# Patient Record
Sex: FEMALE | Race: WHITE | NOT HISPANIC OR LATINO | Employment: FULL TIME | URBAN - METROPOLITAN AREA
[De-identification: names, ages, dates, MRNs, and addresses within clinical notes are randomized per-mention and may not be internally consistent; named-entity substitution may affect disease eponyms.]

---

## 2022-10-20 ENCOUNTER — OFFICE VISIT (OUTPATIENT)
Dept: NEUROSURGERY | Facility: CLINIC | Age: 47
End: 2022-10-20

## 2022-10-20 VITALS
HEIGHT: 64 IN | WEIGHT: 212 LBS | SYSTOLIC BLOOD PRESSURE: 124 MMHG | TEMPERATURE: 97.1 F | BODY MASS INDEX: 36.19 KG/M2 | DIASTOLIC BLOOD PRESSURE: 84 MMHG

## 2022-10-20 DIAGNOSIS — M51.36 LUMBAR DEGENERATIVE DISC DISEASE: Primary | ICD-10-CM

## 2022-10-20 PROCEDURE — 99204 OFFICE O/P NEW MOD 45 MIN: CPT | Performed by: PHYSICIAN ASSISTANT

## 2022-10-20 RX ORDER — EMPAGLIFLOZIN 25 MG/1
TABLET, FILM COATED ORAL
COMMUNITY
Start: 2022-09-03 | End: 2023-03-06 | Stop reason: SDUPTHER

## 2022-10-20 RX ORDER — TIZANIDINE 4 MG/1
TABLET ORAL
COMMUNITY
Start: 2022-08-27

## 2022-10-20 RX ORDER — INSULIN LISPRO 100 [IU]/ML
INJECTION, SOLUTION INTRAVENOUS; SUBCUTANEOUS
COMMUNITY
End: 2023-03-06

## 2022-10-20 RX ORDER — PREGABALIN 150 MG/1
CAPSULE ORAL
COMMUNITY
Start: 2022-09-06

## 2022-10-20 RX ORDER — INSULIN GLARGINE 100 [IU]/ML
INJECTION, SOLUTION SUBCUTANEOUS
COMMUNITY
Start: 2022-07-18 | End: 2022-11-28 | Stop reason: SDUPTHER

## 2022-10-20 RX ORDER — LEVOTHYROXINE SODIUM 112 UG/1
TABLET ORAL
COMMUNITY
Start: 2022-09-15 | End: 2023-03-07 | Stop reason: SDUPTHER

## 2022-10-20 RX ORDER — SITAGLIPTIN AND METFORMIN HYDROCHLORIDE 1000; 50 MG/1; MG/1
TABLET, FILM COATED, EXTENDED RELEASE ORAL
COMMUNITY
End: 2022-11-10

## 2022-10-20 RX ORDER — ATORVASTATIN CALCIUM 20 MG/1
TABLET, FILM COATED ORAL
COMMUNITY
Start: 2022-10-08

## 2022-10-20 RX ORDER — CLOMIPRAMINE HYDROCHLORIDE 50 MG/1
CAPSULE ORAL
COMMUNITY
Start: 2022-07-15 | End: 2022-11-10

## 2022-10-20 RX ORDER — FLUVOXAMINE MALEATE 100 MG
TABLET ORAL
COMMUNITY
End: 2022-11-10

## 2022-10-20 RX ORDER — PROCHLORPERAZINE 25 MG/1
SUPPOSITORY RECTAL
COMMUNITY
Start: 2022-09-23 | End: 2022-12-08 | Stop reason: SDUPTHER

## 2022-10-20 RX ORDER — IBUPROFEN 800 MG/1
TABLET ORAL
COMMUNITY
Start: 2022-08-16

## 2022-10-20 RX ORDER — PROCHLORPERAZINE 25 MG/1
SUPPOSITORY RECTAL
COMMUNITY
Start: 2022-09-20 | End: 2022-11-28 | Stop reason: SDUPTHER

## 2022-10-20 RX ORDER — LISINOPRIL 5 MG/1
TABLET ORAL
COMMUNITY
Start: 2022-08-09 | End: 2022-11-29

## 2022-10-20 RX ORDER — ESZOPICLONE 3 MG/1
TABLET, FILM COATED ORAL
COMMUNITY

## 2022-10-20 NOTE — PROGRESS NOTES
Patient: Sammie Lim  : 1975  Gender: female    Primary Care Provider: Geetha Norton    Requesting Provider:  Geetha Norton  [address] [place] [address]     Chief Complaint: Low back pain    History of Present Illness:  This is a 47-year-old woman with a longstanding history of back pain.  She feels that over the last year her symptoms have progressed.  She has pain in her low back that radiates diffusely into her legs.  The symptoms may be worse on the right side.  Her symptoms are nondermatomal.  She states that her symptoms are constant and nonpositional.  She denies any focal numbness or weakness in her lower extremities.  She denies any bowel or bladder difficulties.  She has been through physical therapy without significant improvement.  She takes ibuprofen and Lyrica for fibromyalgia.  She utilizes marijuana for her pain which is effective.  She is planning to undergo gastric bypass in February in [place].  She presents today with a lumbar MRI.      Past Medical and Surgical History:  No past medical history on file.  No past surgical history on file.    Current Medications:    Current Outpatient Medications:   •  atorvastatin (LIPITOR) 20 MG tablet, , Disp: , Rfl:   •  clomiPRAMINE (ANAFRANIL) 50 MG capsule, , Disp: , Rfl:   •  Continuous Blood Gluc Sensor (Dexcom G6 Sensor), , Disp: , Rfl:   •  Continuous Blood Gluc Transmit (Dexcom G6 Transmitter) misc, , Disp: , Rfl:   •  Diclofenac Sodium (VOLTAREN) 1 % gel gel, diclofenac 1 % topical gel  APPLY 4 GRAM TO THE AFFECTED AREA(S) BY TOPICAL ROUTE 4 TIMES PER DAY, Disp: , Rfl:   •  eszopiclone (LUNESTA) 3 MG tablet, eszopiclone 3 mg tablet, Disp: , Rfl:   •  fluvoxaMINE (LUVOX) 100 MG tablet, fluvoxamine 100 mg tablet, Disp: , Rfl:   •  ibuprofen (ADVIL,MOTRIN) 800 MG tablet, , Disp: , Rfl:   •  Insulin Lispro, 1 Unit Dial, (HUMALOG) 100 UNIT/ML solution pen-injector, Humalog KwikPen (U-100) Insulin 100 unit/mL  subcutaneous  INJECT SUBCUTANEOUSLY PER SLIDING SCALE 5 10 UNITS 3 TIMES DAILY BEFORE MEALS, Disp: , Rfl:   •  Jardiance 25 MG tablet tablet, , Disp: , Rfl:   •  Lantus SoloStar 100 UNIT/ML injection pen, , Disp: , Rfl:   •  levothyroxine (SYNTHROID, LEVOTHROID) 112 MCG tablet, , Disp: , Rfl:   •  lisinopril (PRINIVIL,ZESTRIL) 5 MG tablet, , Disp: , Rfl:   •  pregabalin (LYRICA) 150 MG capsule, , Disp: , Rfl:   •  SITagliptin-metFORMIN HCl ER (Janumet XR)  MG tablet, Janumet XR 50 mg-1,000 mg tablet,extended release, Disp: , Rfl:   •  tiZANidine (ZANAFLEX) 4 MG tablet, , Disp: , Rfl:     Allergies:  Allergies   Allergen Reactions   • Aripiprazole Confusion   • Lithium Confusion         Review of Systems   Constitutional: Positive for fatigue.   HENT: Positive for mouth sores.    Endocrine: Positive for heat intolerance.   Musculoskeletal: Positive for back pain, joint swelling and neck pain.   Psychiatric/Behavioral: Positive for agitation. The patient is nervous/anxious.          Physical Exam  Constitutional:       Appearance: Normal appearance.   HENT:      Head: Normocephalic and atraumatic.   Musculoskeletal:         General: Normal range of motion.      Cervical back: Normal range of motion and neck supple.   Skin:     General: Skin is warm and dry.   Neurological:      Mental Status: She is alert and oriented to person, place, and time.      Sensory: Sensation is intact.      Motor: Motor function is intact.      Coordination: Coordination is intact.      Gait: Gait is intact.      Deep Tendon Reflexes:      Reflex Scores:       Patellar reflexes are 1+ on the right side and 1+ on the left side.       Achilles reflexes are 1+ on the right side and 1+ on the left side.  Psychiatric:         Mood and Affect: Mood normal.         Behavior: Behavior normal.           Vitals:    10/20/22 1426   BP: 124/84   BP Location: Right arm   Patient Position: Sitting   Cuff Size: Adult   Temp: 97.1 °F (36.2 °C)  "  [facility]: Infrared   Weight: 96.2 kg (212 lb)   Height: 162.6 cm (64\")         Independent Review of Diagnostic Imaging:  Lumbar MRI performed 4/29/2022 demonstrates degenerative discs at L4-5 and L5-S1.  At L4-5 there is a more leftward disc protrusion narrowing the foramen.  There is moderate foraminal narrowing at L5-S1.    Assessment:  1.  Low back pain  2.  Lumbar degenerative disc disease  2.  Lumbar stenosis without neurogenic claudication    Plan:  This is a 47-year-old woman who is seen today for evaluation of low back and bilateral leg pain.  Her studies demonstrate degenerative discs at L4-5 and L5-S1.  She has generous canal and more leftward foraminal narrowing at L4-5.  Her symptoms are rather diffuse and nondermatomal.  I would like to refer her for a lumbar epidural injection.  We will see patient in follow-up thereafter.  She will call with worsening symptoms in the interim.        Valarie Kebede PA-C  "

## 2022-11-06 NOTE — PROGRESS NOTES
"Chief Complaint: \"Pain in my lower back and legs.\"        History of Present Illness:   Patient: Ms. Sammie Lim, 47 y.o. female   Referring Physician: Valarie Kebede PA-C   Reason for Referral: Consultation for chronic intractable lower back pain.   Pain History: Patient reports a longstanding history of chronic progressive lower back pain, which began without incident. Pain started around 2007. Pain has progressed in intensity over the past years. Patient complains of lower back pain that radiates diffusely into the posterior aspect of the thighs. MRI of the lumbar spine without contrast on 4/25/2022 revealed normal alignment and vertebral body heights. At L4-L5: Posterior disc bulge, facet hypertrophy, ligamentum flavum hypertrophy contributing to severe canal stenosis with clumping of the nerve roots. Moderate bilateral foraminal stenosis. L5-S1: Mild disc osteophyte bulging disc. Mild canal stenosis. Severe bilateral neuroforaminal stenosis with impingement of the exiting L5 nerve roots bilaterally. Sammie Lim underwent neurosurgical consultation with Valarie Kebede PA-C on 10/20/2022, and was found not to be a surgical candidate. Patient has been referred for consideration for interventional pain management measures. She will follow-up with NSA thereafter. Patient is planning a trip to [place] in February to undergo gastric bypass surgery. Patient has failed to obtain pain relief with conservative measures for more than 3 years including oral analgesics (ibuprofen, Lyrica, etc), marijuana (some relief), topical analgesics, heat, physical therapy (last visit 1-2 years ago, no relief), physical therapist directed home exercise program (ongoing), water therapy in the past (made worse), to name a few  Pain Description: Constant pain with intermittent exacerbation, described as aching, shocking, dull, sharp, and shooting sensation.   Radiation of Pain: The pain radiates into the posterior aspect of " her thighs  Pain intensity today: 3/10  Average pain intensity last week: 7/10  Pain intensity ranges from: 0/10 to 9/10  Aggravating factors: Pain increases with bending, twisting, standing, walking. Patient describes neurogenic claudication.    Alleviating factors: Pain decreases with heat, sitting down, lying down (pain free mostly)  Associated Symptoms:   Patient reports pain, numbness and weakness in the lower extremities.   Patient denies any new bladder or bowel problems.   Patient denies difficulties with her balance or recent falls.   Pain interferes with ADLs, general activities (ability to walk, stand, transition from different positions), and affects patient's quality of life  Pain interferes with sleep causing sleep fragmentation   Muscle spasms  Stiffness    Review of previous therapies and additional medical records:  Sammie Lim has already failed the following measures, including:   Conservative Measures: Oral analgesics (ibuprofen, Lyrica, etc), marijuana (some relief), topical analgesics, heat, physical therapy (last visit 1-2 years ago, no relief), physical therapist directed home exercise program (ongoing), water therapy in the past (made worse)  Interventional Measures: None  Surgical Measures: No history of previous cervical spine, lumbar spine or hip surgery  Sammie Lim underwent neurosurgical consultation with Valarie Kebede PA-C on 10/20/2022, and was found not to be a surgical candidate. Patient has been referred for consideration for interventional pain management measures. She will follow-up with NSA thereafter.   Sammie Lim presents with significant comorbidities including Diabetes, hypothyroidism, depression  In terms of current analgesics, Sammie Lim takes: ibuprofen, Voltaren gel, Lyrica, tizanidine. Patient also takes Pristiq   I have reviewed Samuel Report consistent with medication reconciliation.  SOAPP: Not completed by the patient    PHQ-2  Depression Screening  Little interest or pleasure in doing things? 0-->not at all   Feeling down, depressed, or hopeless? 0-->not at all   PHQ-2 Total Score 0     Patient reports remission of depression with current medications    Global Pain Scale 11-10  2022          Pain 12          Feelings 18          Clinical outcomes 10          Activities 20          GPS Total: 60            The [place] Back Pain Disability Scale  DATE 11-10  2022          Sleep through the night 5          Turn over in bed 0          Get out of bed 3          Make your bed 5          Put on socks (pantyhose) 5          Ride in a car 2          Sit in a chair for several hours 4          Stand up for 20-30 minutes 1          Climb one flight of stairs 1          Walk a few blocks (200-300 yards)  2          Walk several miles 5          Run one block (about 50 yards) 5          Take food out of the refrigerator 2          Reach up to high shelves 5          Move a chair 1          Pull or push heavy doors 1          Bend over to clean the bathtub 5          Throw a ball 5          Carry two bags of groceries 1          Lift and carry a heavy suitcase 5          Total score 63            Review of Diagnostic Studies:  I have reviewed and interpreted the images with the patient and used the images and a tridimensional spine model to explain findings. I have reviewed the report, as well.  MRI of the lumbar spine without contrast on 4/25/2022 revealed normal alignment and vertebral body heights.  The conus is seen at T12-L1 and has unremarkable appearance.  The lower thoracic spine reveals no significant canal foraminal stenosis.  Axial imaging:  L1-L2, L2-L3, L3-L4: No significant canal or foraminal stenosis  L4-L5: Posterior disc bulge, facet hypertrophy, ligamentum flavum hypertrophy contributing to severe canal stenosis with clumping of the nerve roots.  Moderate bilateral foraminal stenosis  L5-S1: Mild disc osteophyte bulging disc. Mild  canal stenosis.  Severe bilateral neuroforaminal stenosis with impingement of the exiting L5 nerve roots bilaterally    Review of Systems   Constitutional: Positive for fatigue.   Endocrine: Positive for heat intolerance.   Musculoskeletal: Positive for arthralgias and back pain.   Neurological: Positive for weakness, numbness and headaches.   Psychiatric/Behavioral: Positive for sleep disturbance. The patient is nervous/anxious.    All other systems reviewed and are negative.        Patient Active Problem List   Diagnosis   • Lumbar stenosis with neurogenic claudication   • Adhesive arachnoiditis   • Ligamentum flavum hypertrophy   • Degeneration of lumbar or lumbosacral intervertebral disc   • Spondylosis of lumbar region without myelopathy or radiculopathy   • Anxiety and depression   • Moderate obesity       Past Medical History:   Diagnosis Date   • Anemia    • Arthritis    • Back problem    • Bladder infection    • Diabetes (HCC)    • Thyroid disease          Past Surgical History:   Procedure Laterality Date   •  SECTION      X2 ,    • CHOLECYSTECTOMY  2019   • HYSTERECTOMY  2019         Family History   Problem Relation Age of Onset   • Cancer Mother    • Alcohol abuse Father    • Kidney disease Father    • Cirrhosis Father    • Diabetes Maternal Aunt          Social History     Socioeconomic History   • Marital status:    Tobacco Use   • Smoking status: Never   Substance and Sexual Activity   • Alcohol use: Never   • Drug use: Yes     Types: Marijuana   • Sexual activity: Defer           Current Outpatient Medications:   •  atorvastatin (LIPITOR) 20 MG tablet, , Disp: , Rfl:   •  Continuous Blood Gluc Sensor (Dexcom G6 Sensor), , Disp: , Rfl:   •  Continuous Blood Gluc Transmit (Dexcom G6 Transmitter) misc, , Disp: , Rfl:   •  desvenlafaxine (PRISTIQ) 100 MG 24 hr tablet, Take 1 tablet by mouth Daily., Disp: , Rfl:   •  eszopiclone (LUNESTA) 3 MG tablet, eszopiclone 3 mg tablet,  "Disp: , Rfl:   •  ibuprofen (ADVIL,MOTRIN) 800 MG tablet, , Disp: , Rfl:   •  Insulin Lispro, 1 Unit Dial, (HUMALOG) 100 UNIT/ML solution pen-injector, Humalog KwikPen (U-100) Insulin 100 unit/mL subcutaneous  INJECT SUBCUTANEOUSLY PER SLIDING SCALE 5 10 UNITS 3 TIMES DAILY BEFORE MEALS, Disp: , Rfl:   •  Jardiance 25 MG tablet tablet, , Disp: , Rfl:   •  Lantus SoloStar 100 UNIT/ML injection pen, , Disp: , Rfl:   •  levothyroxine (SYNTHROID, LEVOTHROID) 112 MCG tablet, , Disp: , Rfl:   •  lisinopril (PRINIVIL,ZESTRIL) 5 MG tablet, , Disp: , Rfl:   •  metFORMIN (GLUCOPHAGE) 1000 MG tablet, , Disp: , Rfl:   •  omeprazole (priLOSEC) 40 MG capsule, Take 1 capsule by mouth Daily., Disp: , Rfl:   •  Otezla 30 MG tablet, , Disp: , Rfl:   •  oxybutynin XL (DITROPAN XL) 15 MG 24 hr tablet, , Disp: , Rfl:   •  pregabalin (LYRICA) 150 MG capsule, , Disp: , Rfl:   •  tiZANidine (ZANAFLEX) 4 MG tablet, , Disp: , Rfl:       Allergies   Allergen Reactions   • Aripiprazole Confusion   • Lithium Confusion         /83   Pulse 86   Temp 95.7 °F (35.4 °C)   Ht 162.6 cm (64\")   Wt 99.5 kg (219 lb 6.4 oz)   SpO2 98%   BMI 37.66 kg/m²       Physical Exam:  Constitutional: Patient appears well-developed, well-nourished, well-hydrated, appears younger than stated age  HEENT: Head: Normocephalic and atraumatic  Eyes: Conjunctivae and lids are normal  Pupils: Equal, round, reactive to light  Peripheral vascular exam: Posterior tibialis: right 2+ and left 2+. Dorsalis pedis: right 2+ and left 2+. No edema.   Musculoskeletal   Gait and station: Gait evaluation demonstrated an antalgic gait. Able to walk on heels, toes, tandem walking   Lumbar spine: Passive and active range of motion are limited secondary to pain. Extension, rotation of the lumbar spine increased and reproduced pain. Lumbar facet joint loading maneuvers are positive.  Jordy test and Gaenslen's test are negative   Piriformis maneuvers are negative   Hip joints: " The range of motion of the hip joints is almost full and without pain   Palpation of the bilateral greater trochanter, unrevealing   Examination of the Iliotibial band: unrevealing   Neurological:   Patient is alert and oriented to person, place, and time.   Speech: Normal.   Cortical function: Normal mental status.   Reflex Scores:  Right patellar: 1+  Left patellar: 1+  Right Achilles: 1+  Left Achilles: 1+  Motor strength: 5/5  Motor Tone: Normal  Involuntary movements: None.   Superficial/Primitive Reflexes: Primitive reflexes were absent.   Right Corey: Absent  Left Corey: Absent  Right ankle clonus: Absent  Left ankle clonus: Absent   Babinsky: Absent  Long tract signs: Negative. Straight leg raising test: Negative. Femoral stretch sign: Negative.   Sensory exam: Intact to light touch, intact pain and temperature sensation, intact vibration sensation and normal proprioception.   Coordination: Normal finger to nose and heel to shin. Normal balance and negative Romberg's sign   Skin and subcutaneous tissue: Skin is warm and intact. No rash noted. No cyanosis.   Psychiatric: Judgment and insight: Normal. Recent and remote memory: Intact. Mood and affect: Normal.     ASSESSMENT:   1. Lumbar stenosis with neurogenic claudication    2. Ligamentum flavum hypertrophy    3. Adhesive arachnoiditis    4. Degeneration of lumbar or lumbosacral intervertebral disc    5. Spondylosis of lumbar region without myelopathy or radiculopathy    6. Moderate obesity    7. Anxiety and depression        PLAN/MEDICAL DECISION MAKING:  Ms. Sammie Lim, 47 y.o. female presents with a longstanding history of chronic progressive lower back pain, which began without incident. Pain started around 2007. Pain has progressed in intensity over the past years. Patient complains of lower back pain that radiates into the posterior aspect of the thighs and it is associated with neurogenic claudication. MRI of the lumbar spine without  contrast on 4/25/2022 revealed at L4-L5: Posterior disc bulge, facet hypertrophy, ligamentum flavum hypertrophy contributing to severe canal stenosis with clumping of the nerve roots. Moderate bilateral foraminal stenosis. L5-S1: Mild disc osteophyte bulging disc. Mild canal stenosis. Severe bilateral neuroforaminal stenosis with impingement of the exiting L5 nerve roots bilaterally. Sammie Lim underwent neurosurgical consultation with Valarie Kebede PA-C on 10/20/2022, and was found not to be a surgical candidate. Patient has been referred for consideration for interventional pain management measures. Patient has failed to obtain pain relief with conservative measures for more than 3 years including oral analgesics (ibuprofen, Lyrica, etc), marijuana (some relief), topical analgesics, heat, physical therapy (last visit 1-2 years ago, no relief), physical therapist directed home exercise program (ongoing), water therapy in the past (made worse), to name a few. A comprehensive evaluation including history and physical exam along with pertinent physiologic and functional assessment was performed. Patient presents with intractable pain due to the diagnoses listed above. Patient has failed to respond to conservative modalities, as referenced under HPI including the impact of patient's moderate-to-severe pain contributing to significant impairment in daily activities, ADLs, and a negative impact on quality of life. Supporting diagnostic studies of patient's chronic pain condition have been reviewed. I have reviewed all available patient's medical records as well as previous therapies as referenced above. I had a lengthy conversation with Ms. Sammie Lim regarding her chronic pain condition and potential therapeutic options including risks, benefits, alternative therapies, to name a few. We have discussed using a stepwise approach starting with the shortest or least intense level of treatment, care, or  service as determined by the extent required to diagnose and or treat patient's condition. The treatments proposed are consistent with the patient's medical condition and are known to be as safe and effective by current guidelines and standard of care. There is no evidence of absolute contraindications for the proposed procedures under the current circumstances. These treatments are not considered experimental or investigational. The duration and frequency proposed are considered appropriate for the service in accordance with accepted standards of medical practice for the diagnosis and or treatment of the patient's condition and or intended to improve the patient's level of function. These services will be furnished in a setting appropriate to the patient's medical needs and condition. Therefore, I have proposed the following plan:  1. Interventional pain management measures: Patient will be scheduled for diagnostic and therapeutic bilateral L4-L5 transforaminal epidural steroid injections using the lowest effective dose of steroids, under C-arm fluoroscopic guidance, with the use of contrast dye (unless contraindicated) to confirm appropriate needle placement and spread of contrast dye. We may repeat therapeutic bilateral L4-L5 transforaminal epidural steroid injections Vs diagnostic and therapeutic bilateral L5-S1 transforaminal epidural steroid injections depending on patient's outcome.  As per current guidelines, epidurals will be limited to a maximum of 4 sessions per spinal region in a rolling twelve (12) month period. Continuation of epidural steroid injections over 12 months would only be considered under the following provisions;  • Patient is a high-risk surgical candidate, or the patient does not desire surgery, or recurrence of pain in the same location relieved with ESIs for at least three months and epidural provides at least 50% sustained improvement of pain and/or 50% objective improvement in  function (using same scale as baseline)  • Pain is severe enough to cause a significant degree of functional disability or vocational disability  • The primary care provider will be notified regarding continuation of procedures and repeat steroid use   Patient will follow-up with NSA thereafter.  Patient appears to be an appropriate candidate for a potential mild procedure as ligamentum flavum hypertrophy is one of the main components of her stenosis.  Unfortunately, this may not be covered under her insurance.  If she is found not to be a surgical candidate, then, she could be potential candidate for a spinal cord stimulator trial   2. Diagnostic studies:  A. Flexion and extension X-rays of the lumbar spine to assess lumbar stability  B. EMG/NCV of the bilateral lower extremities   3. Pharmacological measures: Reviewed and discussed;   A. Patient takes ibuprofen, Voltaren gel, Lyrica, tizanidine. Patient also takes Pristiq   B. Trial with Rheumate one tablet once daily (please provide samples)  C. Start pyridoxine 100 mg one tablet by mouth daily take for 30 days, #30, no refills  D. Start alpha lipoid acid 7690-4246 mg per day divided into 3 doses  E. Alprazolam 0.5 mg take 2 tablets 60 minutes prior to procedure, may repeat 1 tablet 30 minutes prior to procedure, #3, no refills.  Patient cannot drive for 24 hours after taking alprazolam  4. Long-term rehabilitation efforts:  A. The patient does not have a history of falls. I did complete a risk assessment for falls.   B. Patient will start a comprehensive physical therapy program at [facility] for [facility], core strengthening, gait and balance training, neurodynamics, E-STIM, myofascial release, cupping, dry needling, home exercise program  C. Start an exercise program such as water therapy and swimming  D. Contrast therapy: Apply ice-packs for 15-20 minutes, followed by heating pads for 15-20 minutes to affected area   E. [facility]erral [facility]. Patient's Body mass index is 37.66 kg/m². Patient counseled on the importance of weight loss to help with overall health and pain control.   LUI Lim  reports that she has never smoked. She does not have any smokeless tobacco history on file.   5. The patient has been instructed to contact my office with any questions or difficulties. The patient understands the plan and agrees to proceed accordingly.    The patient has a documented plan of care to address chronic pain. Sammie Lim reports a pain score of  3/10.  Given her pain assessment as noted, treatment options were discussed and the following options were decided upon as a follow-up plan to address the patient's pain: continuation of current treatment plan for pain, educational materials on pain management, home exercises and therapy, prescription for non-opiod analgesics, referral to Physical Therapy, referral to specialist for assistance in pain treatment guidance, steroid injections, use of non-medical modalities (ice, heat, stretching and/or behavior modifications) and interventional pain management measures.           Pain Management Panel    There is no flowsheet data to display.        DOMINGO query complete. DOMINGO reviewed by Reese Milton MD.     Pain Medications             desvenlafaxine (PRISTIQ) 100 MG 24 hr tablet Take 1 tablet by mouth Daily.    ibuprofen (ADVIL,MOTRIN) 800 MG tablet     Otezla 30 MG tablet     pregabalin (LYRICA) 150 MG capsule     tiZANidine (ZANAFLEX) 4 MG tablet          Please note that portions of this note were completed with a voice recognition program.     Reese Milton MD    Patient Care Team:  Geetha Norton as PCP - General (Family Medicine)  Reese Milton MD as Consulting Physician (Pain Medicine)     No orders of the defined types were placed in this encounter.        Future Appointments   Date Time Provider Department Center    11/28/2022  8:30 AM Bouchra Chao DO [facility]   12/5/2022  9:00 AM Galo Champagne MD [facility] [id number] [facility]   12/19/2022 10:00 AM Aliyah Bourgeois PA-C [facility]

## 2022-11-10 ENCOUNTER — OFFICE VISIT (OUTPATIENT)
Dept: PAIN MEDICINE | Facility: CLINIC | Age: 47
End: 2022-11-10

## 2022-11-10 VITALS
OXYGEN SATURATION: 98 % | DIASTOLIC BLOOD PRESSURE: 83 MMHG | BODY MASS INDEX: 37.46 KG/M2 | HEART RATE: 86 BPM | HEIGHT: 64 IN | WEIGHT: 219.4 LBS | SYSTOLIC BLOOD PRESSURE: 129 MMHG | TEMPERATURE: 95.7 F

## 2022-11-10 DIAGNOSIS — M51.37 DEGENERATION OF LUMBAR OR LUMBOSACRAL INTERVERTEBRAL DISC: ICD-10-CM

## 2022-11-10 DIAGNOSIS — M47.816 SPONDYLOSIS OF LUMBAR REGION WITHOUT MYELOPATHY OR RADICULOPATHY: ICD-10-CM

## 2022-11-10 DIAGNOSIS — F41.9 ANXIETY AND DEPRESSION: ICD-10-CM

## 2022-11-10 DIAGNOSIS — M48.062 LUMBAR STENOSIS WITH NEUROGENIC CLAUDICATION: ICD-10-CM

## 2022-11-10 DIAGNOSIS — F32.A ANXIETY AND DEPRESSION: ICD-10-CM

## 2022-11-10 DIAGNOSIS — M24.28 LIGAMENTUM FLAVUM HYPERTROPHY: ICD-10-CM

## 2022-11-10 DIAGNOSIS — E66.8 MODERATE OBESITY: ICD-10-CM

## 2022-11-10 DIAGNOSIS — M48.062 LUMBAR STENOSIS WITH NEUROGENIC CLAUDICATION: Primary | ICD-10-CM

## 2022-11-10 DIAGNOSIS — G03.9 ADHESIVE ARACHNOIDITIS: ICD-10-CM

## 2022-11-10 PROBLEM — M51.379 DEGENERATION OF LUMBAR OR LUMBOSACRAL INTERVERTEBRAL DISC: Status: ACTIVE | Noted: 2022-11-10

## 2022-11-10 PROBLEM — E66.9 MODERATE OBESITY: Status: ACTIVE | Noted: 2022-11-10

## 2022-11-10 PROCEDURE — 99204 OFFICE O/P NEW MOD 45 MIN: CPT | Performed by: ANESTHESIOLOGY

## 2022-11-10 RX ORDER — DESVENLAFAXINE 100 MG/1
100 TABLET, EXTENDED RELEASE ORAL DAILY
COMMUNITY

## 2022-11-10 RX ORDER — OMEPRAZOLE 40 MG/1
40 CAPSULE, DELAYED RELEASE ORAL DAILY
COMMUNITY

## 2022-11-10 RX ORDER — APREMILAST 30 MG/1
TABLET, FILM COATED ORAL
COMMUNITY
Start: 2022-10-14

## 2022-11-10 RX ORDER — OXYBUTYNIN CHLORIDE 15 MG/1
TABLET, EXTENDED RELEASE ORAL
COMMUNITY
Start: 2022-08-27

## 2022-11-14 RX ORDER — MULTIVITAMIN WITH IRON
100 TABLET ORAL DAILY
Qty: 30 TABLET | Refills: 0 | Status: SHIPPED | OUTPATIENT
Start: 2022-11-14 | End: 2023-01-09

## 2022-11-14 RX ORDER — ALPRAZOLAM 0.5 MG/1
TABLET ORAL
Qty: 3 TABLET | Refills: 0 | Status: SHIPPED | OUTPATIENT
Start: 2022-11-14 | End: 2023-02-27 | Stop reason: SDUPTHER

## 2022-11-14 RX ORDER — ME-TETRAHYDROFOLATE/B12/HRB236 1-1-500 MG
1 CAPSULE ORAL DAILY
Qty: 90 CAPSULE | Refills: 1 | Status: SHIPPED | OUTPATIENT
Start: 2022-11-14

## 2022-11-14 RX ORDER — ST. JOHN'S WORT 300 MG
400 CAPSULE ORAL 3 TIMES DAILY
Qty: 180 CAPSULE | Refills: 5 | Status: SHIPPED | OUTPATIENT
Start: 2022-11-14 | End: 2023-01-09

## 2022-11-21 ENCOUNTER — HOSPITAL ENCOUNTER (OUTPATIENT)
Dept: GENERAL RADIOLOGY | Facility: HOSPITAL | Age: 47
Discharge: HOME | End: 2022-11-21
Admitting: ANESTHESIOLOGY

## 2022-11-21 DIAGNOSIS — M48.062 LUMBAR STENOSIS WITH NEUROGENIC CLAUDICATION: ICD-10-CM

## 2022-11-21 DIAGNOSIS — M47.816 SPONDYLOSIS OF LUMBAR REGION WITHOUT MYELOPATHY OR RADICULOPATHY: ICD-10-CM

## 2022-11-21 PROCEDURE — 72120 X-RAY BEND ONLY L-S SPINE: CPT

## 2022-11-22 RX ORDER — SODIUM, POTASSIUM,MAG SULFATES 17.5-3.13G
SOLUTION, RECONSTITUTED, ORAL ORAL
Qty: 354 ML | Refills: 0 | Status: SHIPPED | OUTPATIENT
Start: 2022-11-22

## 2022-11-28 ENCOUNTER — OFFICE VISIT (OUTPATIENT)
Dept: ENDOCRINOLOGY | Facility: CLINIC | Age: 47
End: 2022-11-28

## 2022-11-28 ENCOUNTER — LAB (OUTPATIENT)
Dept: LAB | Facility: HOSPITAL | Age: 47
End: 2022-11-28

## 2022-11-28 VITALS
OXYGEN SATURATION: 96 % | HEART RATE: 84 BPM | SYSTOLIC BLOOD PRESSURE: 122 MMHG | HEIGHT: 62 IN | DIASTOLIC BLOOD PRESSURE: 75 MMHG | BODY MASS INDEX: 39.38 KG/M2 | WEIGHT: 214 LBS

## 2022-11-28 DIAGNOSIS — E78.2 MIXED HYPERLIPIDEMIA: ICD-10-CM

## 2022-11-28 DIAGNOSIS — Z79.4 TYPE 2 DIABETES MELLITUS WITH HYPERGLYCEMIA, WITH LONG-TERM CURRENT USE OF INSULIN: Primary | ICD-10-CM

## 2022-11-28 DIAGNOSIS — E03.9 ACQUIRED HYPOTHYROIDISM: ICD-10-CM

## 2022-11-28 DIAGNOSIS — E11.65 TYPE 2 DIABETES MELLITUS WITH HYPERGLYCEMIA, WITH LONG-TERM CURRENT USE OF INSULIN: Primary | ICD-10-CM

## 2022-11-28 LAB
ALBUMIN SERPL-MCNC: 4.1 G/DL (ref 3.5–5.2)
ALBUMIN UR-MCNC: <1.2 MG/DL
ALBUMIN/GLOB SERPL: 1.4 G/DL
ALP SERPL-CCNC: 74 U/L (ref 39–117)
ALT SERPL W P-5'-P-CCNC: 17 U/L (ref 1–33)
ANION GAP SERPL CALCULATED.3IONS-SCNC: 9.2 MMOL/L (ref 5–15)
AST SERPL-CCNC: 13 U/L (ref 1–32)
BILIRUB SERPL-MCNC: 0.3 MG/DL (ref 0–1.2)
BUN SERPL-MCNC: 11 MG/DL (ref 6–20)
BUN/CREAT SERPL: 19.6 (ref 7–25)
CALCIUM SPEC-SCNC: 9.3 MG/DL (ref 8.6–10.5)
CHLORIDE SERPL-SCNC: 102 MMOL/L (ref 98–107)
CHOLEST SERPL-MCNC: 158 MG/DL (ref 0–200)
CO2 SERPL-SCNC: 26.8 MMOL/L (ref 22–29)
CREAT SERPL-MCNC: 0.56 MG/DL (ref 0.57–1)
CREAT UR-MCNC: 46.8 MG/DL
DEPRECATED RDW RBC AUTO: 38.6 FL (ref 37–54)
EGFRCR SERPLBLD CKD-EPI 2021: 114.9 ML/MIN/1.73
ERYTHROCYTE [DISTWIDTH] IN BLOOD BY AUTOMATED COUNT: 12.6 % (ref 12.3–15.4)
EXPIRATION DATE: ABNORMAL
EXPIRATION DATE: NORMAL
GLOBULIN UR ELPH-MCNC: 3 GM/DL
GLUCOSE BLDC GLUCOMTR-MCNC: 221 MG/DL (ref 70–130)
GLUCOSE SERPL-MCNC: 166 MG/DL (ref 65–99)
HBA1C MFR BLD: 7.9 %
HCT VFR BLD AUTO: 40.1 % (ref 34–46.6)
HDLC SERPL-MCNC: 65 MG/DL (ref 40–60)
HGB BLD-MCNC: 12.8 G/DL (ref 12–15.9)
LDLC SERPL CALC-MCNC: 81 MG/DL (ref 0–100)
LDLC/HDLC SERPL: 1.25 {RATIO}
Lab: ABNORMAL
Lab: NORMAL
MCH RBC QN AUTO: 26.9 PG (ref 26.6–33)
MCHC RBC AUTO-ENTMCNC: 31.9 G/DL (ref 31.5–35.7)
MCV RBC AUTO: 84.4 FL (ref 79–97)
MICROALBUMIN/CREAT UR: NORMAL MG/G{CREAT}
PLATELET # BLD AUTO: 398 10*3/MM3 (ref 140–450)
PMV BLD AUTO: 10.8 FL (ref 6–12)
POTASSIUM SERPL-SCNC: 4.1 MMOL/L (ref 3.5–5.2)
PROT SERPL-MCNC: 7.1 G/DL (ref 6–8.5)
RBC # BLD AUTO: 4.75 10*6/MM3 (ref 3.77–5.28)
SODIUM SERPL-SCNC: 138 MMOL/L (ref 136–145)
TRIGL SERPL-MCNC: 60 MG/DL (ref 0–150)
TSH SERPL DL<=0.05 MIU/L-ACNC: 0.3 UIU/ML (ref 0.27–4.2)
VLDLC SERPL-MCNC: 12 MG/DL (ref 5–40)
WBC NRBC COR # BLD: 6.19 10*3/MM3 (ref 3.4–10.8)

## 2022-11-28 PROCEDURE — 80050 GENERAL HEALTH PANEL: CPT | Performed by: INTERNAL MEDICINE

## 2022-11-28 PROCEDURE — 99204 OFFICE O/P NEW MOD 45 MIN: CPT | Performed by: INTERNAL MEDICINE

## 2022-11-28 PROCEDURE — 82570 ASSAY OF URINE CREATININE: CPT | Performed by: INTERNAL MEDICINE

## 2022-11-28 PROCEDURE — 80061 LIPID PANEL: CPT | Performed by: INTERNAL MEDICINE

## 2022-11-28 PROCEDURE — 82043 UR ALBUMIN QUANTITATIVE: CPT | Performed by: INTERNAL MEDICINE

## 2022-11-28 PROCEDURE — 83036 HEMOGLOBIN GLYCOSYLATED A1C: CPT | Performed by: INTERNAL MEDICINE

## 2022-11-28 PROCEDURE — 95251 CONT GLUC MNTR ANALYSIS I&R: CPT | Performed by: INTERNAL MEDICINE

## 2022-11-28 RX ORDER — PROCHLORPERAZINE 25 MG/1
SUPPOSITORY RECTAL
Qty: 9 EACH | Refills: 3 | Status: SHIPPED | OUTPATIENT
Start: 2022-11-28

## 2022-11-28 RX ORDER — INSULIN GLARGINE 100 [IU]/ML
35 INJECTION, SOLUTION SUBCUTANEOUS DAILY
Qty: 45 ML | Refills: 1 | Status: SHIPPED | OUTPATIENT
Start: 2022-11-28 | End: 2023-03-06

## 2022-11-28 NOTE — PROGRESS NOTES
Chief Complaint   Patient presents with   • Diabetes        Referring Provider  Geetha Norton*     HPI   Sammie Lim is a 47 y.o. female had concerns including Diabetes.    Diabetes was diagnosed .  Current medications for diabetes include metformin 1000 mg twice (recently changed from janumet), ozempic 0.25 mg weekly (increasing to 0.5 mg this week), lantus 40 units once daily, jardiance 25 mg daily, humalog 3-7 units as needed for high BGs for high carb meals - takes infrequently  She checks her blood sugar 4+ times per day with dexcom. Data reviewed from 11/15/22-22 with avg , SD 66, 57%. Has high postprandial glucose levels, highest after soda.  In target range 57% of the time, high 29%, very high 14%.  She has a history of DKA a few years ago - was sick with flu. She stopped all of her medication, A1c was up to 13. Was not on jardiance at that time.    Diet:  Breakfast: doesn't, drinks coffee with splenda, half and half  Lunch: joanne robbins unwich, Mexican, chicken sandwich - avoids bread  Dinner: snacks - sometimes a bag of popcorn  Drinks: regular soda once a day    Has been on thyroid medication for a long time. Sleep is disrupted - takes lunesta. Wakes several times at night.   Takes levothyroxine as directed with no missed doses.     She has a spinal procedure a week from now.   Also has colonoscopy soon.     Is on ACEI but no history of HTN and no known history of microalbuminuria.     Past Medical History:   Diagnosis Date   • Anemia    • Arthritis    • Back problem    • Bladder infection    • Diabetes (HCC)    • Gestational diabetes    • Hypothyroidism    • Thyroid disease    • Type 2 diabetes mellitus (HCC)      Past Surgical History:   Procedure Laterality Date   •  SECTION      X2 ,    • CHOLECYSTECTOMY  2019   • HYSTERECTOMY  2019      Family History   Problem Relation Age of Onset   • Cancer Mother    • Alcohol abuse Father    •  Kidney disease Father    • Cirrhosis Father    • Diabetes Maternal Aunt       Social History     Socioeconomic History   • Marital status:    Tobacco Use   • Smoking status: Never   Substance and Sexual Activity   • Alcohol use: Never   • Drug use: Yes     Types: Marijuana   • Sexual activity: Not Currently     Partners: Male     Birth control/protection: Post-menopausal, Hysterectomy      Allergies   Allergen Reactions   • Aripiprazole Confusion   • Lithium Confusion      Current Outpatient Medications on File Prior to Visit   Medication Sig Dispense Refill   • Alpha Lipoic Acid 200 MG capsule Take 400 mg by mouth 3 (Three) Times a Day. 180 capsule 5   • ALPRAZolam (Xanax) 0.5 MG tablet take 2 tablets 60 minutes prior to procedure, may repeat 1 tablet 30 minutes prior to procedure 3 tablet 0   • atorvastatin (LIPITOR) 20 MG tablet      • Continuous Blood Gluc Transmit (Dexcom G6 Transmitter) misc      • desvenlafaxine (PRISTIQ) 100 MG 24 hr tablet Take 1 tablet by mouth Daily.     • Dietary Management Product (Rheumate) capsule Take 1 capsule by mouth Daily. 90 capsule 1   • eszopiclone (LUNESTA) 3 MG tablet eszopiclone 3 mg tablet     • ibuprofen (ADVIL,MOTRIN) 800 MG tablet      • Insulin Lispro, 1 Unit Dial, (HUMALOG) 100 UNIT/ML solution pen-injector Humalog KwikPen (U-100) Insulin 100 unit/mL subcutaneous   INJECT SUBCUTANEOUSLY PER SLIDING SCALE 5 10 UNITS 3 TIMES DAILY BEFORE MEALS     • Jardiance 25 MG tablet tablet      • levothyroxine (SYNTHROID, LEVOTHROID) 112 MCG tablet      • lisinopril (PRINIVIL,ZESTRIL) 5 MG tablet      • omeprazole (priLOSEC) 40 MG capsule Take 1 capsule by mouth Daily.     • Otezla 30 MG tablet      • oxybutynin XL (DITROPAN XL) 15 MG 24 hr tablet      • pregabalin (LYRICA) 150 MG capsule      • Semaglutide,0.25 or 0.5MG/DOS, (OZEMPIC) 2 MG/1.5ML solution pen-injector Inject 0.5 mg under the skin into the appropriate area as directed 1 (One) Time Per Week.     •  "sodium-potassium-magnesium sulfates (Suprep Bowel Prep Kit) 17.5-3.13-1.6 GM/177ML solution oral solution Use as directed by provider for colonoscopy prep 354 mL 0   • tiZANidine (ZANAFLEX) 4 MG tablet      • vitamin B-6 (PYRIDOXINE) 100 MG tablet Take 1 tablet by mouth Daily. 30 tablet 0   • [DISCONTINUED] Continuous Blood Gluc Sensor (Dexcom G6 Sensor)      • [DISCONTINUED] Lantus SoloStar 100 UNIT/ML injection pen      • [DISCONTINUED] metFORMIN (GLUCOPHAGE) 1000 MG tablet        No current facility-administered medications on file prior to visit.        Review of Systems   Constitutional: Positive for appetite change.   HENT: Negative.    Respiratory: Negative.    Cardiovascular: Negative.    Gastrointestinal: Negative.    Endocrine:        See HPI   Genitourinary: Negative.    Musculoskeletal: Positive for arthralgias and back pain.   Skin: Negative.    Hematological: Negative.    Psychiatric/Behavioral: Negative.         /75   Pulse 84   Ht 157.5 cm (62\")   Wt 97.1 kg (214 lb)   SpO2 96%   BMI 39.14 kg/m²      Physical Exam  Cardiovascular:      Pulses:           Dorsalis pedis pulses are 2+ on the right side and 2+ on the left side.   Feet:      Right foot:      Skin integrity: Dry skin present.      Toenail Condition: Right toenails are normal.      Left foot:      Skin integrity: Dry skin present.      Toenail Condition: Left toenails are normal.      Comments: Monofilament 5/5 bilaterally    Diabetic Foot Exam Performed and Monofilament Test Performed            Constitutional:  well developed; well nourished  no acute distress  obese - Body mass index is 39.14 kg/m².   ENT/Thyroid: no thyromegaly  no palpable nodules   Eyes: EOM intact  Conjunctiva: clear   Respiratory:  breathing is unlabored  clear to auscultation bilaterally   Cardiovascular:  regular rate and rhythm, S1, S2 normal, no murmur, click, rub or gallop   Chest:  Not performed.   Abdomen: Not performed.   : Not performed. "   Musculoskeletal: negative findings:  ROM of all joints is normal, no deformities present   Skin: dry and warm   Neuro: normal without focal findings and mental status, speech normal, alert and oriented x3   Psych: oriented to time, place and person, mood and affect are within normal limits     LABS AND IMAGING    HbA1c:  Lab Results   Component Value Date    HGBA1C 7.9 11/28/2022     Glucose:  Lab Results   Component Value Date    POCGLU 221 (A) 11/28/2022       Assessment and Plan    Diagnoses and all orders for this visit:    1. Type 2 diabetes mellitus with hyperglycemia, with long-term current use of insulin (HCC) (Primary)  Uncontrolled with hyperglycemia.  A1c 7.9.  No other complications from diabetes.  Continue metformin 1000 mg twice daily, Jardiance 25 mg daily.  Increase Ozempic to 0.5 mg as scheduled this week, titrate up monthly to max tolerated dose.  Decrease Lantus to 35 units, titrate down on dose by 5 units to avoid BG's less than 100 often.  Continue Humalog 3 to 7 units as needed for high carb meals or hyperglycemia.  Continue CGM.    D/c regular soda.  Monofilament updated today.  Ophtho exam should be updated yearly.  Check labs today.  Can discontinue ACE inhibitor if no microalbuminuria. Is only indicated in secondary prevention of diabetic kidney disease or in the setting of hypertension.  -     POC Glucose, Blood  -     POC Glycosylated Hemoglobin (Hb A1C)  -     Lantus SoloStar 100 UNIT/ML injection pen; Inject 35 Units under the skin into the appropriate area as directed Daily.  Dispense: 45 mL; Refill: 1  -     Continuous Blood Gluc Sensor (Dexcom G6 Sensor); Apply  topically Every 10 (Ten) Days.  Dispense: 9 each; Refill: 3  -     CBC (No Diff)  -     Comprehensive Metabolic Panel  -     Microalbumin / Creatinine Urine Ratio - Urine, Clean Catch  -     metFORMIN (GLUCOPHAGE) 1000 MG tablet; Take 1 tablet by mouth 2 (Two) Times a Day With Meals.  Dispense: 360 tablet; Refill: 1    2.  Acquired hypothyroidism  Clinically euthyroid on levothyroxine 112 mcg daily.  Check TSH today and titrate levothyroxine if needed for TSH in the lower range of normal.  -     TSH    3. Mixed hyperlipidemia  On atorvastatin 20 mg daily.  Check fasting lipids today.  -     Lipid Panel         Return in about 3 months (around 2/28/2023) for next scheduled follow up. The patient was instructed to contact the clinic with any interval questions or concerns.    Bouchra Chao, DO   Endocrinologist    Please note that portions of this note were completed with a voice recognition program.

## 2022-11-28 NOTE — PATIENT INSTRUCTIONS
We can increase the dose of ozempic every month. Please call/message after 4 weeks on 0.5 mg weekly dose for another prescription/dose increase.     Decrease lantus to 35 units. Decrease by 5 units as needed for Bgs often < 100.

## 2022-12-05 ENCOUNTER — OUTSIDE FACILITY SERVICE (OUTPATIENT)
Dept: GASTROENTEROLOGY | Facility: CLINIC | Age: 47
End: 2022-12-05

## 2022-12-05 PROCEDURE — 45378 DIAGNOSTIC COLONOSCOPY: CPT | Performed by: INTERNAL MEDICINE

## 2022-12-07 ENCOUNTER — PATIENT MESSAGE (OUTPATIENT)
Dept: ENDOCRINOLOGY | Facility: CLINIC | Age: 47
End: 2022-12-07

## 2022-12-07 DIAGNOSIS — Z79.4 TYPE 2 DIABETES MELLITUS WITH HYPERGLYCEMIA, WITH LONG-TERM CURRENT USE OF INSULIN: Primary | ICD-10-CM

## 2022-12-07 DIAGNOSIS — E11.65 TYPE 2 DIABETES MELLITUS WITH HYPERGLYCEMIA, WITH LONG-TERM CURRENT USE OF INSULIN: Primary | ICD-10-CM

## 2022-12-08 RX ORDER — PROCHLORPERAZINE 25 MG/1
1 SUPPOSITORY RECTAL
Qty: 1 EACH | Refills: 3 | Status: SHIPPED | OUTPATIENT
Start: 2022-12-08 | End: 2022-12-08

## 2022-12-08 RX ORDER — PROCHLORPERAZINE 25 MG/1
1 SUPPOSITORY RECTAL
Qty: 1 EACH | Refills: 3 | Status: SHIPPED | OUTPATIENT
Start: 2022-12-08

## 2022-12-09 ENCOUNTER — PRIOR AUTHORIZATION (OUTPATIENT)
Dept: ENDOCRINOLOGY | Facility: CLINIC | Age: 47
End: 2022-12-09

## 2022-12-09 NOTE — TELEPHONE ENCOUNTER
Sammie coughlin (Key: [id number])  Rx #: [id number]  Ozempic (0.25 or 0.5 MG/DOSE) 2MG/1.5ML pen-injectors     Form  [employer] Electronic PA Form (2017 [employer])  Created  15 hours ago  Sent to Plan  8 minutes ago  Plan Response  8 minutes ago  Submit Clinical Questions  6 minutes ago  Determination  Favorable  6 minutes ago  Message from Plan  PA Case: [id number], Status: Approved, Coverage Starts on: 12/9/2022 12:00:00 AM, Coverage Ends on: 12/9/2023 12:00:00 AM.

## 2022-12-12 ENCOUNTER — OUTSIDE FACILITY SERVICE (OUTPATIENT)
Dept: PAIN MEDICINE | Facility: CLINIC | Age: 47
End: 2022-12-12

## 2022-12-12 PROCEDURE — 64479 NJX AA&/STRD TFRM EPI C/T 1: CPT | Performed by: ANESTHESIOLOGY

## 2022-12-13 ENCOUNTER — TELEPHONE (OUTPATIENT)
Dept: PAIN MEDICINE | Facility: CLINIC | Age: 47
End: 2022-12-13

## 2022-12-13 NOTE — TELEPHONE ENCOUNTER
FOLLOW-UP CALL AFTER PROCEDURE  Spoke with pt regarding how they are feeling after yesterday's procedure with Dr. Milton. Patient advises that they are doing well.   Pain level before procedure: 9/10   Pain level after procedure: 0/10  Patient reports that the pain relief lasting for hours. (pain level 2/10).   Patient denies side effects or complications  Patient does not have any questions or concerns at this time. I advised pt of their next follow up with TESSY Millan.

## 2023-01-05 DIAGNOSIS — E11.65 TYPE 2 DIABETES MELLITUS WITH HYPERGLYCEMIA, WITH LONG-TERM CURRENT USE OF INSULIN: Primary | ICD-10-CM

## 2023-01-05 DIAGNOSIS — Z79.4 TYPE 2 DIABETES MELLITUS WITH HYPERGLYCEMIA, WITH LONG-TERM CURRENT USE OF INSULIN: Primary | ICD-10-CM

## 2023-01-05 RX ORDER — SEMAGLUTIDE 1.34 MG/ML
1 INJECTION, SOLUTION SUBCUTANEOUS
Qty: 3 ML | Refills: 1 | Status: SHIPPED | OUTPATIENT
Start: 2023-01-05 | End: 2023-02-15 | Stop reason: ALTCHOICE

## 2023-01-09 ENCOUNTER — OFFICE VISIT (OUTPATIENT)
Dept: NEUROSURGERY | Facility: CLINIC | Age: 48
End: 2023-01-09
Payer: COMMERCIAL

## 2023-01-09 VITALS
DIASTOLIC BLOOD PRESSURE: 64 MMHG | BODY MASS INDEX: 37.98 KG/M2 | WEIGHT: 206.4 LBS | SYSTOLIC BLOOD PRESSURE: 112 MMHG | HEIGHT: 62 IN | TEMPERATURE: 96.9 F

## 2023-01-09 DIAGNOSIS — M19.90 ARTHRITIS: Primary | ICD-10-CM

## 2023-01-09 DIAGNOSIS — M47.816 SPONDYLOSIS OF LUMBAR REGION WITHOUT MYELOPATHY OR RADICULOPATHY: ICD-10-CM

## 2023-01-09 DIAGNOSIS — M48.062 LUMBAR STENOSIS WITH NEUROGENIC CLAUDICATION: ICD-10-CM

## 2023-01-09 DIAGNOSIS — M51.37 DEGENERATION OF LUMBAR OR LUMBOSACRAL INTERVERTEBRAL DISC: ICD-10-CM

## 2023-01-09 PROCEDURE — 99213 OFFICE O/P EST LOW 20 MIN: CPT | Performed by: PHYSICIAN ASSISTANT

## 2023-01-09 NOTE — PROGRESS NOTES
Sammie MCKENZIE Raphael   1975   [id number]       2023     Chief Complaint   Patient presents with   • Back Pain        HPI   This 48 yo female has chronic LBP, for the past year she endorses pain into both legs. She has been to PM for injections of bilateral transforaminal narrowing at L4-5.     Chronic Illnesses:  Past Medical History:  No date: Anemia  No date: Arthritis  No date: Back problem  No date: Bladder infection  No date: Diabetes (HCC)  : Gestational diabetes  : Hypothyroidism  No date: Thyroid disease  2000: Type 2 diabetes mellitus (HCC)     Past Surgical History:   Procedure Laterality Date   •  SECTION      X2 ,    • CHOLECYSTECTOMY  2019   • HYSTERECTOMY  2019        Allergies   Allergen Reactions   • Aripiprazole Confusion   • Lithium Confusion          Current Outpatient Medications:   •  ALPRAZolam (Xanax) 0.5 MG tablet, take 2 tablets 60 minutes prior to procedure, may repeat 1 tablet 30 minutes prior to procedure, Disp: 3 tablet, Rfl: 0  •  atorvastatin (LIPITOR) 20 MG tablet, , Disp: , Rfl:   •  Continuous Blood Gluc Sensor (Dexcom G6 Sensor), Apply  topically Every 10 (Ten) Days., Disp: 9 each, Rfl: 3  •  Continuous Blood Gluc Transmit (Dexcom G6 Transmitter) misc, 1 each by Other route Every 3 (Three) Months., Disp: 1 each, Rfl: 3  •  desvenlafaxine (PRISTIQ) 100 MG 24 hr tablet, Take 1 tablet by mouth Daily., Disp: , Rfl:   •  Dietary Management Product (Rheumate) capsule, Take 1 capsule by mouth Daily., Disp: 90 capsule, Rfl: 1  •  eszopiclone (LUNESTA) 3 MG tablet, eszopiclone 3 mg tablet, Disp: , Rfl:   •  ibuprofen (ADVIL,MOTRIN) 800 MG tablet, , Disp: , Rfl:   •  Insulin Lispro, 1 Unit Dial, (HUMALOG) 100 UNIT/ML solution pen-injector, Humalog KwikPen (U-100) Insulin 100 unit/mL subcutaneous  INJECT SUBCUTANEOUSLY PER SLIDING SCALE 5 10 UNITS 3 TIMES DAILY BEFORE MEALS, Disp: , Rfl:   •  Jardiance 25 MG tablet tablet, , Disp: , Rfl:   •  Lantus  SoloStar 100 UNIT/ML injection pen, Inject 35 Units under the skin into the appropriate area as directed Daily., Disp: 45 mL, Rfl: 1  •  levothyroxine (SYNTHROID, LEVOTHROID) 112 MCG tablet, , Disp: , Rfl:   •  metFORMIN (GLUCOPHAGE) 1000 MG tablet, Take 1 tablet by mouth 2 (Two) Times a Day With Meals., Disp: 360 tablet, Rfl: 1  •  omeprazole (priLOSEC) 40 MG capsule, Take 1 capsule by mouth Daily., Disp: , Rfl:   •  Otezla 30 MG tablet, , Disp: , Rfl:   •  oxybutynin XL (DITROPAN XL) 15 MG 24 hr tablet, , Disp: , Rfl:   •  pregabalin (LYRICA) 150 MG capsule, , Disp: , Rfl:   •  Semaglutide, 1 MG/DOSE, (Ozempic, 1 MG/DOSE,) 4 MG/3ML solution pen-injector, Inject 1 mg under the skin into the appropriate area as directed Every 7 (Seven) Days., Disp: 3 mL, Rfl: 1  •  sodium-potassium-magnesium sulfates (Suprep Bowel Prep Kit) 17.5-3.13-1.6 GM/177ML solution oral solution, Use as directed by provider for colonoscopy prep, Disp: 354 mL, Rfl: 0  •  tiZANidine (ZANAFLEX) 4 MG tablet, , Disp: , Rfl:      Social History     Socioeconomic History   • Marital status:    Tobacco Use   • Smoking status: Never   Substance and Sexual Activity   • Alcohol use: Never   • Drug use: Yes     Types: Marijuana   • Sexual activity: Not Currently     Partners: Male     Birth control/protection: Post-menopausal, Hysterectomy        family history includes Alcohol abuse in her father; Cancer in her mother; Cirrhosis in her father; Diabetes in her maternal aunt; Kidney disease in her father.     Social History    Tobacco Use      Smoking status: Never      Smokeless tobacco: Not on file       Body mass index is 37.75 kg/m².   Class 2 Severe Obesity (BMI >=35 and <=39.9). Obesity-related health conditions include the following: osteoarthritis. Obesity is unchanged. BMI is is above average; BMI management plan is completed.     /64 (BP Location: Right arm, Patient Position: Sitting, Cuff Size: Adult)   Temp 96.9 °F (36.1 °C)  "(Infrared)   Ht 157.5 cm (62\")   Wt 93.6 kg (206 lb 6.4 oz)   BMI 37.75 kg/m²    Physical Examination:  HEENT-wnl  Lungs-No wheezing or SOB      Neurologic Exam   Patient is alert and oriented.  Pupils are equal and reactive.  Visual fields are full.  EOMI without nystagmus.    Gait is stable.  Reflexes 1+.  No focal weakness in the lower extremities.    Radiological Data Review:  For my review today, prior MRI and xrays that we have reviewed.    Assessment and Plan:  Diagnoses and all orders for this visit:    1. Arthritis (Primary)    2. Lumbar stenosis with neurogenic claudication    3. Degeneration of lumbar or lumbosacral intervertebral disc    4. Spondylosis of lumbar region without myelopathy or radiculopathy    Patient will f/u with PM to discuss further treatment modalities.    No f/u with neurosurgery required.       Aliyah Bourgeois, JOAN      PCP:  Geetha Norton   "

## 2023-02-15 ENCOUNTER — TELEPHONE (OUTPATIENT)
Dept: ENDOCRINOLOGY | Facility: CLINIC | Age: 48
End: 2023-02-15
Payer: COMMERCIAL

## 2023-02-15 DIAGNOSIS — E11.65 TYPE 2 DIABETES MELLITUS WITH HYPERGLYCEMIA, WITH LONG-TERM CURRENT USE OF INSULIN: ICD-10-CM

## 2023-02-15 DIAGNOSIS — E11.65 TYPE 2 DIABETES MELLITUS WITH HYPERGLYCEMIA, WITH LONG-TERM CURRENT USE OF INSULIN: Primary | ICD-10-CM

## 2023-02-15 DIAGNOSIS — Z79.4 TYPE 2 DIABETES MELLITUS WITH HYPERGLYCEMIA, WITH LONG-TERM CURRENT USE OF INSULIN: Primary | ICD-10-CM

## 2023-02-15 DIAGNOSIS — Z79.4 TYPE 2 DIABETES MELLITUS WITH HYPERGLYCEMIA, WITH LONG-TERM CURRENT USE OF INSULIN: ICD-10-CM

## 2023-02-15 RX ORDER — SEMAGLUTIDE 2.68 MG/ML
2 INJECTION, SOLUTION SUBCUTANEOUS
Qty: 9 ML | Refills: 1 | Status: SHIPPED | OUTPATIENT
Start: 2023-02-15 | End: 2023-02-15 | Stop reason: SDUPTHER

## 2023-02-15 RX ORDER — SEMAGLUTIDE 2.68 MG/ML
2 INJECTION, SOLUTION SUBCUTANEOUS
Qty: 9 ML | Refills: 1 | Status: SHIPPED | OUTPATIENT
Start: 2023-02-15 | End: 2023-04-03 | Stop reason: ALTCHOICE

## 2023-02-15 NOTE — TELEPHONE ENCOUNTER
----- Message from Sammie Lim sent at 2/15/2023 11:58 AM EST -----  Regarding: Ozempic  Contact: [phone]  Also can you please use the [facility] on [address]. Thanks again

## 2023-02-27 ENCOUNTER — OFFICE VISIT (OUTPATIENT)
Dept: PAIN MEDICINE | Facility: CLINIC | Age: 48
End: 2023-02-27
Payer: COMMERCIAL

## 2023-02-27 VITALS — HEIGHT: 64 IN | WEIGHT: 192.8 LBS | BODY MASS INDEX: 32.91 KG/M2

## 2023-02-27 DIAGNOSIS — F41.9 ANXIETY AND DEPRESSION: ICD-10-CM

## 2023-02-27 DIAGNOSIS — G03.9 ADHESIVE ARACHNOIDITIS: ICD-10-CM

## 2023-02-27 DIAGNOSIS — M47.816 SPONDYLOSIS OF LUMBAR REGION WITHOUT MYELOPATHY OR RADICULOPATHY: ICD-10-CM

## 2023-02-27 DIAGNOSIS — M51.37 DEGENERATION OF LUMBAR OR LUMBOSACRAL INTERVERTEBRAL DISC: ICD-10-CM

## 2023-02-27 DIAGNOSIS — F32.A ANXIETY AND DEPRESSION: ICD-10-CM

## 2023-02-27 DIAGNOSIS — M24.28 LIGAMENTUM FLAVUM HYPERTROPHY: ICD-10-CM

## 2023-02-27 DIAGNOSIS — M48.062 LUMBAR STENOSIS WITH NEUROGENIC CLAUDICATION: ICD-10-CM

## 2023-02-27 DIAGNOSIS — E66.8 MODERATE OBESITY: ICD-10-CM

## 2023-02-27 DIAGNOSIS — M48.062 LUMBAR STENOSIS WITH NEUROGENIC CLAUDICATION: Primary | ICD-10-CM

## 2023-02-27 PROCEDURE — 99213 OFFICE O/P EST LOW 20 MIN: CPT | Performed by: NURSE PRACTITIONER

## 2023-02-27 RX ORDER — SECUKINUMAB 150 MG/ML
INJECTION SUBCUTANEOUS
COMMUNITY
Start: 2023-02-06

## 2023-02-27 RX ORDER — ALPRAZOLAM 0.5 MG/1
TABLET ORAL
Qty: 3 TABLET | Refills: 0 | Status: SHIPPED | OUTPATIENT
Start: 2023-02-27

## 2023-02-27 RX ORDER — MELOXICAM 15 MG/1
TABLET ORAL
COMMUNITY
Start: 2023-02-09

## 2023-02-27 NOTE — PROGRESS NOTES
"Chief Complaint: \"Gluteal pain.\"        History of Present Illness:   Patient: Ms. Sammie Lim, 47 y.o. female originally referred by Valarie Kebede PA-C in consultation for chronic intractable lower back pain.  Patient reports a longstanding history of chronic lower back pain, which began without incident.  An MRI of the lumbar spine without contrast on 4/25/2022, revealed a posterior disc bulge with facet hypertrophy and ligamentum flavum thickening which contributes to severe spinal canal with clumping of the nerve roots at L4-L5 and moderate bilateral neuroforaminal stenosis.  L5-S1 there is a disc osteophyte complex with disc bulge creating mild canal stenosis with severe bilateral neuroforaminal stenosis.  She underwent neurosurgical consultation with Valarie Kebede PA-C on 10/20/2022, and most recently follow-up consultation with Kacey Bourgeois PA-C on 1/9/2023, and was found not to be a surgical candidate.  We last saw her on December 12, 2022, when she underwent diagnostic and therapeutic bilateral L4-L5 transforaminal epidural steroid injections, from which she reports experiencing 100% pain relief and functional improvement that is ongoing of the symptoms she felt that radiated into the posterior aspect of her thighs.  She tells me she has been walking about 2 miles per day.  Today she complains of more prominent bilateral gluteal pain, exacerbated by protracted sitting.  She did not begin physical therapy thereafter.  She returns today for postprocedure follow-up and evaluation.  Pain Description: Constant pain with intermittent exacerbation, described as aching, shocking, dull, sharp, and shooting sensation.   Radiation of Pain: The pain radiates into the bilateral gluteal region, she no longer experiences symptoms into her thighs  Pain intensity today: 2/10  Average pain intensity last week: 2/10  Pain intensity ranges from: 2/10 to 7/10  Aggravating factors: Pain increases with protracted " sitting, bending, twisting, standing, walking (2 miles).     Alleviating factors: Pain decreases with heat, lying down (pain free mostly)  Associated Symptoms:   Patient denies pain, numbness and weakness in the lower extremities today.   Patient denies any new bladder or bowel problems.   Patient denies difficulties with her balance or recent falls.   Pain interferes with ADLs, general activities (ability to walk, stand, transition from different positions), and affects patient's quality of life  Pain interferes with sleep causing sleep fragmentation       Review of previous therapies and additional medical records:  Sammie Lim has already failed the following measures, including:   Conservative Measures: Oral analgesics, marijuana, topical analgesics, heat, physical therapy, physical therapist directed home exercise program (ongoing), water therapy in the past  Interventional Measures: 12/12/2022: DxTx bilateral L4-L5 transforaminal epidural steroid injections  Surgical Measures: No history of previous cervical spine, lumbar spine or hip surgery  Sammie Lim underwent neurosurgical consultation with Valarie Kebede PA-C on 10/20/2022, and was found not to be a surgical candidate. Patient has been referred for consideration for interventional pain management measures. She will follow-up with NSA thereafter.   Sammie Lim presents with significant comorbidities including Diabetes, hypothyroidism, depression  In terms of current analgesics, Sammie Lim takes: ibuprofen, Voltaren gel, Lyrica, tizanidine. Patient also takes Pristiq   I have reviewed Samuel Report consistent with medication reconciliation.  SOAPP: Not completed by the patient      Global Pain Scale 11-10  2022 02-27  2023         Pain 12 10         Feelings 18 12         Clinical outcomes 10 12         Activities 20 12         GPS Total: 60 56           The [place] Back Pain Disability Scale  DATE 11-10  2022  02-27 2023         Sleep through the night 5 5         Turn over in bed 0 1         Get out of bed 3 1         Make your bed 5 5         Put on socks (pantyhose) 5 2         Ride in a car 2 1         Sit in a chair for several hours 4 2         Stand up for 20-30 minutes 1 0         Climb one flight of stairs 1 0         Walk a few blocks (200-300 yards)  2 0         Walk several miles 5 1         Run one block (about 50 yards) 5 2         Take food out of the refrigerator 2 1         Reach up to high shelves 5 5         Move a chair 1 4         Pull or push heavy doors 1 2         Bend over to clean the bathtub 5 5         Throw a ball 5 5         Carry two bags of groceries 1 1         Lift and carry a heavy suitcase 5 5         Total score 63 48           Review of New Diagnostic Studies:  Lumbar spine x-rays and flexion-extension views 11/21/2022: No evidence of listhesis or instability.    Review of Diagnostic Studies:    MRI of the lumbar spine without contrast 4/25/2022:   L1-L2, L2-L3, L3-L4: No significant canal or foraminal stenosis  L4-L5: Posterior disc bulge, facet hypertrophy, ligamentum flavum hypertrophy contributing to severe canal stenosis with clumping of the nerve roots.  Moderate bilateral foraminal stenosis  L5-S1: Mild disc osteophyte bulging disc. Mild canal stenosis.  Severe bilateral neuroforaminal stenosis with impingement of the exiting L5 nerve roots bilaterally    Review of Systems   Musculoskeletal: Positive for arthralgias, back pain and joint swelling.   All other systems reviewed and are negative.        Patient Active Problem List   Diagnosis   • Lumbar stenosis with neurogenic claudication   • Adhesive arachnoiditis   • Ligamentum flavum hypertrophy   • Degeneration of lumbar or lumbosacral intervertebral disc   • Spondylosis of lumbar region without myelopathy or radiculopathy   • Anxiety and depression   • Moderate obesity   • Arthritis       Past Medical History:   Diagnosis  Date   • Anemia    • Arthritis    • Back problem    • Bladder infection    • Diabetes (HCC)    • Gestational diabetes    • Hypothyroidism    • Thyroid disease    • Type 2 diabetes mellitus (HCC)          Past Surgical History:   Procedure Laterality Date   •  SECTION      X2 ,    • CHOLECYSTECTOMY  2019   • HYSTERECTOMY  2019         Family History   Problem Relation Age of Onset   • Cancer Mother    • Alcohol abuse Father    • Kidney disease Father    • Cirrhosis Father    • Diabetes Maternal Aunt          Social History     Socioeconomic History   • Marital status:    Tobacco Use   • Smoking status: Never   Substance and Sexual Activity   • Alcohol use: Never   • Drug use: Yes     Types: Marijuana   • Sexual activity: Not Currently     Partners: Male     Birth control/protection: Post-menopausal, Hysterectomy           Current Outpatient Medications:   •  atorvastatin (LIPITOR) 20 MG tablet, , Disp: , Rfl:   •  Continuous Blood Gluc Sensor (Dexcom G6 Sensor), Apply  topically Every 10 (Ten) Days., Disp: 9 each, Rfl: 3  •  Continuous Blood Gluc Transmit (Dexcom G6 Transmitter) misc, 1 each by Other route Every 3 (Three) Months., Disp: 1 each, Rfl: 3  •  Cosentyx Sensoready Pen 150 MG/ML solution auto-injector, , Disp: , Rfl:   •  desvenlafaxine (PRISTIQ) 100 MG 24 hr tablet, Take 1 tablet by mouth Daily., Disp: , Rfl:   •  Dietary Management Product (Rheumate) capsule, Take 1 capsule by mouth Daily., Disp: 90 capsule, Rfl: 1  •  eszopiclone (LUNESTA) 3 MG tablet, eszopiclone 3 mg tablet, Disp: , Rfl:   •  ibuprofen (ADVIL,MOTRIN) 800 MG tablet, , Disp: , Rfl:   •  Insulin Lispro, 1 Unit Dial, (HUMALOG) 100 UNIT/ML solution pen-injector, Humalog KwikPen (U-100) Insulin 100 unit/mL subcutaneous  INJECT SUBCUTANEOUSLY PER SLIDING SCALE 5 10 UNITS 3 TIMES DAILY BEFORE MEALS, Disp: , Rfl:   •  Jardiance 25 MG tablet tablet, , Disp: , Rfl:   •  Lantus SoloStar 100 UNIT/ML  "injection pen, Inject 35 Units under the skin into the appropriate area as directed Daily., Disp: 45 mL, Rfl: 1  •  levothyroxine (SYNTHROID, LEVOTHROID) 112 MCG tablet, , Disp: , Rfl:   •  meloxicam (MOBIC) 15 MG tablet, , Disp: , Rfl:   •  metFORMIN (GLUCOPHAGE) 1000 MG tablet, Take 1 tablet by mouth 2 (Two) Times a Day With Meals., Disp: 360 tablet, Rfl: 1  •  omeprazole (priLOSEC) 40 MG capsule, Take 1 capsule by mouth Daily., Disp: , Rfl:   •  Otezla 30 MG tablet, , Disp: , Rfl:   •  oxybutynin XL (DITROPAN XL) 15 MG 24 hr tablet, , Disp: , Rfl:   •  pregabalin (LYRICA) 150 MG capsule, , Disp: , Rfl:   •  Semaglutide, 2 MG/DOSE, (Ozempic, 2 MG/DOSE,) 8 MG/3ML solution pen-injector, Inject 2 mg under the skin into the appropriate area as directed Every 7 (Seven) Days., Disp: 9 mL, Rfl: 1  •  sodium-potassium-magnesium sulfates (Suprep Bowel Prep Kit) 17.5-3.13-1.6 GM/177ML solution oral solution, Use as directed by provider for colonoscopy prep, Disp: 354 mL, Rfl: 0  •  tiZANidine (ZANAFLEX) 4 MG tablet, , Disp: , Rfl:   •  ALPRAZolam (Xanax) 0.5 MG tablet, take 2 tablets 60 minutes prior to procedure, may repeat 1 tablet 30 minutes prior to procedure, Disp: 3 tablet, Rfl: 0      Allergies   Allergen Reactions   • Aripiprazole Confusion   • Lithium Confusion         Ht 162.6 cm (64\")   Wt 87.5 kg (192 lb 12.8 oz)   BMI 33.09 kg/m²       Physical Exam:  Constitutional: Patient appears well-developed, well-nourished, well-hydrated, appears younger than stated age  HEENT: Head: Normocephalic and atraumatic  Eyes: Conjunctivae and lids are normal  Pupils: Equal, round, reactive to light  Peripheral vascular exam: Posterior tibialis: right 2+ and left 2+. Dorsalis pedis: right 2+ and left 2+. No edema.   Musculoskeletal   Gait and station: Gait evaluation demonstrated a normal gait  Lumbar spine: Passive and active range of motion are improved with minimal pain. Extension of the lumbar spine increased and " reproduced pain. Lumbar facet joint loading maneuvers are positive.  Jordy test, Yeomans, SI compression, and Gaenslen's test are negative   Piriformis maneuvers are negative   Hip joints: The range of motion of the hip joints is almost full and without pain   Palpation of the bilateral greater trochanter, unrevealing   Examination of the Iliotibial band: unrevealing   Neurological:   Patient is alert and oriented to person, place, and time.   Speech: Normal.   Cortical function: Normal mental status.   Reflex Scores:  Right patellar: 1+  Left patellar: 1+  Right Achilles: 1+  Left Achilles: 1+  Motor strength: 5/5  Motor Tone: Normal  Involuntary movements: None.   Superficial/Primitive Reflexes: Primitive reflexes were absent.   Right Corey: Absent  Left Corey: Absent  Right ankle clonus: Absent  Left ankle clonus: Absent   Babinsky: Absent  Long tract signs: Negative. Straight leg raising test: Negative. Femoral stretch sign: Negative.   Sensory exam: Intact to light touch, intact pain and temperature sensation, intact vibration sensation and normal proprioception.   Coordination: Normal finger to nose and heel to shin. Normal balance and negative Romberg's sign   Skin and subcutaneous tissue: Skin is warm and intact. No rash noted. No cyanosis.   Psychiatric: Judgment and insight: Normal. Recent and remote memory: Intact. Mood and affect: Normal.     ASSESSMENT:   1. Lumbar stenosis with neurogenic claudication    2. Adhesive arachnoiditis    3. Spondylosis of lumbar region without myelopathy or radiculopathy    4. Degeneration of lumbar or lumbosacral intervertebral disc    5. Ligamentum flavum hypertrophy    6. Moderate obesity    7. Anxiety and depression        PLAN/MEDICAL DECISION MAKING:  Ms. Sammie Lim, 47 y.o. female  reports a longstanding history of chronic lower back pain, which began without incident.  An MRI of the lumbar spine without contrast on 4/25/2022, revealed a posterior  disc bulge with facet hypertrophy and ligamentum flavum thickening which contributes to severe spinal canal with clumping of the nerve roots at L4-L5 and moderate bilateral neuroforaminal stenosis.  L5-S1 there is a disc osteophyte complex with disc bulge creating mild canal stenosis with severe bilateral neuroforaminal stenosis.  She underwent neurosurgical consultation with Valarie Kebede PA-C on 10/20/2022, and most recently follow-up consultation with Kacey Bourgeois PA-C on 1/9/2023, and was found not to be a surgical candidate.  Patient has failed to obtain pain relief with conservative measures including oral analgesics (ibuprofen, Lyrica, etc), marijuana (some relief), topical analgesics, heat, physical therapy, physical therapist directed home exercise program (ongoing), water therapy in the past, to name a few.  Patient underwent bilateral L4-L5 epidural steroid injections, that has provided her with 100% relief of her symptoms that extend into the posterior thighs.  She is now having more symptoms located to the bilateral gluteal region, consistent with an L5-S1 distribution.  She has agreed to some physical therapy there after injection.  I have reviewed all available patient's medical records as well as previous therapies as referenced above. I had a lengthy conversation with Ms. Sammie Ford Lim regarding her chronic pain condition and potential therapeutic options including risks, benefits, alternative therapies, to name a few. Therefore, I have proposed the following plan:  1. Interventional pain management measures: Patient will be scheduled for diagnostic and therapeutic bilateral L5-S1 transforaminal epidural steroid injections.  We may repeat epidural depending on patient's outcome and distribution of pain.  Patient has recently been found not to be a surgical candidate, therefore she could be a potential candidate for spinal cord similar trial if failure to achieve sustained relief from  minimally invasive procedures.  We have briefly discussed this modality today.  2. Diagnostic studies:  A. We may consider EMG/NCV of the bilateral lower extremities pending on continued response  3. Pharmacological measures: Reviewed and discussed;   A. Patient takes ibuprofen, Voltaren gel, Lyrica, tizanidine. Patient also takes Pristiq   B. Continue Rheumate one tablet once daily   4. Long-term rehabilitation efforts:  A. The patient does not have a history of falls. I did complete a risk assessment for falls.   B. Patient will start a comprehensive physical therapy program for Alter-G, core strengthening, gait and balance training, neurodynamics, E-STIM, myofascial release, cupping, dry needling, home exercise program  C. Start an exercise program such as water therapy and swimming  D. Contrast therapy: Apply ice-packs for 15-20 minutes, followed by heating pads for 15-20 minutes to affected area   E. Patient's Body mass index is 33.1 kg/m². Patient counseled on the importance of weight loss to help with overall health and pain control.  She is actively engaging in weight loss.  5. The patient has been instructed to contact my office with any questions or difficulties. The patient understands the plan and agrees to proceed accordingly.        Pain Medications             desvenlafaxine (PRISTIQ) 100 MG 24 hr tablet Take 1 tablet by mouth Daily.    ibuprofen (ADVIL,MOTRIN) 800 MG tablet     meloxicam (MOBIC) 15 MG tablet     Otezla 30 MG tablet     pregabalin (LYRICA) 150 MG capsule     tiZANidine (ZANAFLEX) 4 MG tablet          Please note that portions of this note were completed with a voice recognition program.     TESSY Gatica    Patient Care Team:  Geetha Norton as PCP - General (Family Medicine)  Reese Milton MD as Consulting Physician (Pain Medicine)  Bouchra Chao DO as Consulting Physician (Endocrinology)  Alise Lynn MD as Consulting Physician  (Rheumatology)  Anabel Avina, TESSY as Nurse Practitioner (Pain Medicine)     No orders of the defined types were placed in this encounter.        Future Appointments   Date Time Provider Department Center   3/6/2023 10:45 AM Bouchra Chao DO [facility]   3/20/2023  8:00 AM Galo Champagne MD [id number] [facility]

## 2023-03-06 ENCOUNTER — OFFICE VISIT (OUTPATIENT)
Dept: ENDOCRINOLOGY | Facility: CLINIC | Age: 48
End: 2023-03-06
Payer: COMMERCIAL

## 2023-03-06 VITALS
BODY MASS INDEX: 31.92 KG/M2 | DIASTOLIC BLOOD PRESSURE: 69 MMHG | HEIGHT: 64 IN | HEART RATE: 100 BPM | WEIGHT: 187 LBS | SYSTOLIC BLOOD PRESSURE: 110 MMHG | OXYGEN SATURATION: 97 %

## 2023-03-06 DIAGNOSIS — Z79.4 TYPE 2 DIABETES MELLITUS WITHOUT COMPLICATION, WITH LONG-TERM CURRENT USE OF INSULIN: Primary | ICD-10-CM

## 2023-03-06 DIAGNOSIS — E03.9 ACQUIRED HYPOTHYROIDISM: ICD-10-CM

## 2023-03-06 DIAGNOSIS — E66.9 CLASS 1 OBESITY WITH SERIOUS COMORBIDITY AND BODY MASS INDEX (BMI) OF 32.0 TO 32.9 IN ADULT, UNSPECIFIED OBESITY TYPE: ICD-10-CM

## 2023-03-06 DIAGNOSIS — E11.9 TYPE 2 DIABETES MELLITUS WITHOUT COMPLICATION, WITH LONG-TERM CURRENT USE OF INSULIN: Primary | ICD-10-CM

## 2023-03-06 LAB
EXPIRATION DATE: NORMAL
EXPIRATION DATE: NORMAL
GLUCOSE BLDC GLUCOMTR-MCNC: 129 MG/DL (ref 70–130)
HBA1C MFR BLD: 5.8 %
Lab: NORMAL
Lab: NORMAL
T4 FREE SERPL-MCNC: 2.06 NG/DL (ref 0.93–1.7)
TSH SERPL DL<=0.05 MIU/L-ACNC: 0.03 UIU/ML (ref 0.27–4.2)

## 2023-03-06 PROCEDURE — 84443 ASSAY THYROID STIM HORMONE: CPT | Performed by: INTERNAL MEDICINE

## 2023-03-06 PROCEDURE — 95251 CONT GLUC MNTR ANALYSIS I&R: CPT | Performed by: INTERNAL MEDICINE

## 2023-03-06 PROCEDURE — 83036 HEMOGLOBIN GLYCOSYLATED A1C: CPT | Performed by: INTERNAL MEDICINE

## 2023-03-06 PROCEDURE — 99214 OFFICE O/P EST MOD 30 MIN: CPT | Performed by: INTERNAL MEDICINE

## 2023-03-06 PROCEDURE — 84439 ASSAY OF FREE THYROXINE: CPT | Performed by: INTERNAL MEDICINE

## 2023-03-06 RX ORDER — EMPAGLIFLOZIN 25 MG/1
25 TABLET, FILM COATED ORAL DAILY
Qty: 90 TABLET | Refills: 1 | Status: SHIPPED | OUTPATIENT
Start: 2023-03-06

## 2023-03-06 NOTE — PROGRESS NOTES
"Chief Complaint   Patient presents with   • Diabetes          HPI   Sammie Lim is a 47 y.o. female had concerns including Diabetes.    She is checking blood sugar 4+ times per day with CGM. Data reviewed from the last two weeks shows average glucose 136 with standard deviation of 26. In target range 94%, high 5%, very high less than 1%, low 0%.   Pattern of: Occasional postprandial hyperglycemia    BGs dropped to the 80s at times at work if she is not eating.  Current medications for diabetes include metformin 1000 mg twice daily, Jardiance 25 mg daily, Ozempic 2 mg weekly, Lantus 10 units once daily.    Has infrequent nausea, is tolerated. Has chronic constipation.  No change in these since Ozempic was increased.    Has lost 27 pounds since her last visit here.    The following portions of the patient's history were reviewed and updated as appropriate: allergies, current medications, past family history, past medical history, past social history, past surgical history and problem list.      Review of Systems   Constitutional: Negative.    Gastrointestinal: Positive for constipation and nausea.   Endocrine:        See HPI        Physical Exam  Vitals reviewed.   Constitutional:       Appearance: Normal appearance. She is obese.      Comments: Body mass index is 32.1 kg/m².   Cardiovascular:      Rate and Rhythm: Normal rate.   Pulmonary:      Effort: Pulmonary effort is normal.   Neurological:      General: No focal deficit present.      Mental Status: She is alert. Mental status is at baseline.   Psychiatric:         Mood and Affect: Mood normal.         Behavior: Behavior normal.        /69   Pulse 100   Ht 162.6 cm (64\")   Wt 84.8 kg (187 lb)   SpO2 97%   BMI 32.10 kg/m²      Labs and imaging    CMP:  Lab Results   Component Value Date    BUN 14 03/03/2023    CREATININE 0.49 (L) 03/03/2023    BCR 28.6 (H) 03/03/2023     03/03/2023    K 4.3 03/03/2023    CO2 29.0 03/03/2023    CALCIUM " 9.7 03/03/2023    ALBUMIN 4.5 03/03/2023    BILITOT 0.5 03/03/2023    ALKPHOS 56 03/03/2023    AST 16 03/03/2023    ALT 14 03/03/2023     Lipid Panel:  Lab Results   Component Value Date    CHOL 158 11/28/2022    TRIG 60 11/28/2022    HDL 65 (H) 11/28/2022    VLDL 12 11/28/2022    LDL 81 11/28/2022     HbA1c:  Lab Results   Component Value Date    HGBA1C 5.8 03/06/2023    HGBA1C 7.9 11/28/2022     Glucose:    Lab Results   Component Value Date    POCGLU 129 03/06/2023     Microalbumin:  Lab Results   Component Value Date    MALBCRERATIO  11/28/2022      Comment:      Unable to calculate     TSH:  Lab Results   Component Value Date    TSH 0.296 11/28/2022       Assessment and plan  Diagnoses and all orders for this visit:    1. Type 2 diabetes mellitus without complication, with long-term current use of insulin (HCC) (Primary)  Now controlled with A1c down to 5.8.  No complications from diabetes.  Discontinue Lantus.  Continue metformin 1000 mg twice daily, Jardiance 25 mg daily, Ozempic 2 mg weekly.    Call the office if BG's are persistently greater than 180.  Labs are up-to-date from November.  Ophtho exam should be updated yearly.  Monofilament up-to-date from November.  -     POC Glucose, Blood  -     POC Glycosylated Hemoglobin (Hb A1C)  -     Jardiance 25 MG tablet tablet; Take 1 tablet by mouth Daily.  Dispense: 90 tablet; Refill: 1    2. Acquired hypothyroidism  Clinically euthyroid though complaining of occasional palpitations.  Heart rate 100 today.  Currently on levothyroxine 112 mcg daily and last TSH was in the lowest range of normal.  Recheck TFTs as she may have exogenous hyperthyroidism with a degree of weight loss experienced since her last visit here.  Titrate levothyroxine as needed.  -     T4, Free  -     TSH    3. Class 1 obesity with serious comorbidity and body mass index (BMI) of 32.0 to 32.9 in adult, unspecified obesity type  BMI down to 32.1 with a 27 pound weight loss.  Congratulated on  weight loss and encouraged to continue efforts.  Continue Ozempic as above.       Return in about 4 months (around 7/6/2023). The patient was instructed to contact the clinic with any interval questions or concerns.    Bouchra Chao, DO   Endocrinologist    Please note that portions of this note were completed with a voice recognition program.

## 2023-03-07 DIAGNOSIS — E03.9 ACQUIRED HYPOTHYROIDISM: Primary | ICD-10-CM

## 2023-03-07 RX ORDER — LEVOTHYROXINE SODIUM 88 UG/1
88 TABLET ORAL DAILY
Qty: 30 TABLET | Refills: 5 | Status: SHIPPED | OUTPATIENT
Start: 2023-03-07

## 2023-03-13 ENCOUNTER — TRANSCRIBE ORDERS (OUTPATIENT)
Dept: DIABETES SERVICES | Facility: HOSPITAL | Age: 48
End: 2023-03-13
Payer: COMMERCIAL

## 2023-03-13 DIAGNOSIS — E11.9 DIET-CONTROLLED TYPE 2 DIABETES MELLITUS: Primary | ICD-10-CM

## 2023-03-13 DIAGNOSIS — R53.83 FATIGUE, UNSPECIFIED TYPE: ICD-10-CM

## 2023-03-13 DIAGNOSIS — M48.062 LUMBAR STENOSIS WITH NEUROGENIC CLAUDICATION: Primary | ICD-10-CM

## 2023-03-15 ENCOUNTER — OUTSIDE FACILITY SERVICE (OUTPATIENT)
Dept: PAIN MEDICINE | Facility: CLINIC | Age: 48
End: 2023-03-15
Payer: COMMERCIAL

## 2023-03-15 PROCEDURE — 64483 NJX AA&/STRD TFRM EPI L/S 1: CPT | Performed by: ANESTHESIOLOGY

## 2023-03-15 PROCEDURE — 99152 MOD SED SAME PHYS/QHP 5/>YRS: CPT | Performed by: ANESTHESIOLOGY

## 2023-03-17 ENCOUNTER — TELEPHONE (OUTPATIENT)
Dept: PAIN MEDICINE | Facility: CLINIC | Age: 48
End: 2023-03-17
Payer: COMMERCIAL

## 2023-04-03 DIAGNOSIS — Z79.4 TYPE 2 DIABETES MELLITUS WITHOUT COMPLICATION, WITH LONG-TERM CURRENT USE OF INSULIN: Primary | ICD-10-CM

## 2023-04-03 DIAGNOSIS — E11.9 TYPE 2 DIABETES MELLITUS WITHOUT COMPLICATION, WITH LONG-TERM CURRENT USE OF INSULIN: Primary | ICD-10-CM

## 2023-04-03 RX ORDER — DULAGLUTIDE 4.5 MG/.5ML
4.5 INJECTION, SOLUTION SUBCUTANEOUS
Qty: 2 ML | Refills: 3 | Status: SHIPPED | OUTPATIENT
Start: 2023-04-03

## 2023-04-10 RX ORDER — SODIUM, POTASSIUM,MAG SULFATES 17.5-3.13G
SOLUTION, RECONSTITUTED, ORAL ORAL
Qty: 354 ML | Refills: 0 | Status: SHIPPED | OUTPATIENT
Start: 2023-04-10

## 2023-05-03 DIAGNOSIS — Z79.4 TYPE 2 DIABETES MELLITUS WITHOUT COMPLICATION, WITH LONG-TERM CURRENT USE OF INSULIN: Primary | ICD-10-CM

## 2023-05-03 DIAGNOSIS — E11.9 TYPE 2 DIABETES MELLITUS WITHOUT COMPLICATION, WITH LONG-TERM CURRENT USE OF INSULIN: Primary | ICD-10-CM

## 2023-05-03 RX ORDER — TIRZEPATIDE 2.5 MG/.5ML
2.5 INJECTION, SOLUTION SUBCUTANEOUS
Qty: 2 ML | Refills: 0 | Status: SHIPPED | OUTPATIENT
Start: 2023-05-03

## 2023-05-04 ENCOUNTER — PRIOR AUTHORIZATION (OUTPATIENT)
Dept: ENDOCRINOLOGY | Facility: CLINIC | Age: 48
End: 2023-05-04
Payer: COMMERCIAL

## 2023-05-04 NOTE — TELEPHONE ENCOUNTER
Sammie Lim (Key: [id number])  Rx #: [id number]  Mounjaro 2.5MG/0.5ML pen-injectors     Form  [employer] Electronic PA Form (2017 N[employer])  Created  23 hours ago  Sent to Plan  10 minutes ago  Plan Response  9 minutes ago  Submit Clinical Questions  2 minutes ago  Determination  Favorable  2 minutes ago  Message from Plan  PA Case: [id number], Status: Approved, Coverage Starts on: 5/4/2023 12:00:00 AM, Coverage Ends on: 5/3/2024

## 2023-05-09 ENCOUNTER — TRANSCRIBE ORDERS (OUTPATIENT)
Dept: ADMINISTRATIVE | Facility: HOSPITAL | Age: 48
End: 2023-05-09
Payer: COMMERCIAL

## 2023-05-09 DIAGNOSIS — Z12.31 VISIT FOR SCREENING MAMMOGRAM: Primary | ICD-10-CM

## 2023-05-10 ENCOUNTER — TELEPHONE (OUTPATIENT)
Dept: ENDOCRINOLOGY | Facility: CLINIC | Age: 48
End: 2023-05-10
Payer: COMMERCIAL

## 2023-05-25 DIAGNOSIS — Z79.4 TYPE 2 DIABETES MELLITUS WITHOUT COMPLICATION, WITH LONG-TERM CURRENT USE OF INSULIN: Primary | ICD-10-CM

## 2023-05-25 DIAGNOSIS — E11.9 TYPE 2 DIABETES MELLITUS WITHOUT COMPLICATION, WITH LONG-TERM CURRENT USE OF INSULIN: Primary | ICD-10-CM

## 2023-05-25 RX ORDER — TIRZEPATIDE 5 MG/.5ML
5 INJECTION, SOLUTION SUBCUTANEOUS
Qty: 2 ML | Refills: 1 | Status: SHIPPED | OUTPATIENT
Start: 2023-05-25

## 2023-06-05 DIAGNOSIS — E11.9 TYPE 2 DIABETES MELLITUS WITHOUT COMPLICATION, WITH LONG-TERM CURRENT USE OF INSULIN: ICD-10-CM

## 2023-06-05 DIAGNOSIS — Z79.4 TYPE 2 DIABETES MELLITUS WITHOUT COMPLICATION, WITH LONG-TERM CURRENT USE OF INSULIN: ICD-10-CM

## 2023-06-05 RX ORDER — TIRZEPATIDE 5 MG/.5ML
5 INJECTION, SOLUTION SUBCUTANEOUS
Qty: 2 ML | Refills: 1 | Status: SHIPPED | OUTPATIENT
Start: 2023-06-05

## 2023-06-19 ENCOUNTER — OFFICE VISIT (OUTPATIENT)
Dept: PAIN MEDICINE | Facility: CLINIC | Age: 48
End: 2023-06-19
Payer: COMMERCIAL

## 2023-06-19 VITALS — BODY MASS INDEX: 29.37 KG/M2 | RESPIRATION RATE: 16 BRPM | HEIGHT: 64 IN | WEIGHT: 172 LBS

## 2023-06-19 DIAGNOSIS — G03.9 ADHESIVE ARACHNOIDITIS: ICD-10-CM

## 2023-06-19 DIAGNOSIS — M47.816 SPONDYLOSIS OF LUMBAR REGION WITHOUT MYELOPATHY OR RADICULOPATHY: ICD-10-CM

## 2023-06-19 DIAGNOSIS — M51.37 DEGENERATION OF LUMBAR OR LUMBOSACRAL INTERVERTEBRAL DISC: ICD-10-CM

## 2023-06-19 DIAGNOSIS — F32.A ANXIETY AND DEPRESSION: ICD-10-CM

## 2023-06-19 DIAGNOSIS — F41.9 ANXIETY AND DEPRESSION: ICD-10-CM

## 2023-06-19 DIAGNOSIS — M48.062 LUMBAR STENOSIS WITH NEUROGENIC CLAUDICATION: ICD-10-CM

## 2023-06-19 DIAGNOSIS — M24.28 LIGAMENTUM FLAVUM HYPERTROPHY: ICD-10-CM

## 2023-06-19 PROCEDURE — 99213 OFFICE O/P EST LOW 20 MIN: CPT | Performed by: NURSE PRACTITIONER

## 2023-06-19 NOTE — PROGRESS NOTES
"Chief Complaint: \"I am doing very well.\"        History of Present Illness:   Patient: Ms. Sammie Lim, 48 y.o. female originally referred by Valarie Kebede PA-C in consultation for chronic intractable lower back pain.  Patient reports a longstanding history of chronic lower back pain, which began without incident.  An MRI of the lumbar spine without contrast on 4/25/2022, revealed a posterior disc bulge with facet hypertrophy and ligamentum flavum thickening which contributes to severe spinal canal with clumping of the nerve roots at L4-L5 and moderate bilateral neuroforaminal stenosis.  L5-S1 there is a disc osteophyte complex with disc bulge creating mild canal stenosis with severe bilateral neuroforaminal stenosis.  She underwent neurosurgical consultation with Valarie Kebede PA-C on 10/20/2022, and most recently follow-up consultation with Kacey Bourgeois PA-C on 1/9/2023, and was found not to be a surgical candidate.  We last saw her on March 15, 2023, when she underwent diagnostic and therapeutic bilateral L5-S1 transforaminal epidural steroid injections, from which she reports experiencing almost complete pain relief and functional improvement that is ongoing.  Additionally, on December 12, 2022, she underwent diagnostic and therapeutic bilateral L4-L5 transforaminal epidural steroid injections, from which she reports experiencing 100% pain relief and functional improvement that is ongoing of the symptoms she felt that radiated into the posterior aspect of her thighs.  She tells me she has now been walking about 5 miles 2-3 times per week, and 3 to 4 miles on the other days.  She returns today for postprocedure follow-up and evaluation.  Pain Description: Previously a constant pain with intermittent exacerbation, described as aching, shocking, dull, sharp, and shooting sensation.   Radiation of Pain: The pain previously radiated into the bilateral gluteal region, she no longer experiences symptoms " into her thighs  Pain intensity today: 0/10  Average pain intensity last week: 0/10  Pain intensity ranges from: 0/10 to 4/10  Aggravating factors: Pain increases with protracted sitting, bending, twisting, standing, walking (2 miles).     Alleviating factors: Pain decreases with heat, lying down (pain free mostly)  Associated Symptoms:   Patient denies pain, numbness and weakness in the lower extremities today.   Patient denies any new bladder or bowel problems.   Patient denies difficulties with her balance or recent falls.   Pain no longer interferes with ADLs, general activities (ability to walk, stand, transition from different positions)  Pain interferes with sleep causing sleep fragmentation       Review of previous therapies and additional medical records:  Sammie Lim has already failed the following measures, including:   Conservative Measures: Oral analgesics, marijuana, topical analgesics, heat, physical therapy, physical therapist directed home exercise program (ongoing), water therapy in the past  Interventional Measures: 12/12/2022: DxTx bilateral L4-L5 transforaminal epidural steroid injections  03/15/2023: DxTx bilateral L5-S1 transforaminal epidural steroid injections  Surgical Measures: No history of previous cervical spine, lumbar spine or hip surgery  Sammie Lim underwent neurosurgical consultation with Valarie Kebede PA-C on 10/20/2022, and was found not to be a surgical candidate. Most recently follow-up consultation with Kacey Bourgeois PA-C on 1/9/2023, and was found not to be a surgical candidate.   Sammie Lim presents with significant comorbidities including Diabetes, hypothyroidism, depression  In terms of current analgesics, Sammie Lim takes: ibuprofen, Voltaren gel, Lyrica, tizanidine. Patient also takes Pristiq   I have reviewed Samuel Report consistent with medication reconciliation.  SOAPP: Not completed by the patient    PHQ-2  Depression Screening  Little interest or pleasure in doing things? 0-->not at all   Feeling down, depressed, or hopeless? 1-->several days   PHQ-2 Total Score 1           Global Pain Scale 11-10  2022 02-27  2023 06-19  37205        Pain 12 10 2        Feelings 18 12 3        Clinical outcomes 10 12 1        Activities 20 12 0        GPS Total: 60 56 6          The [place] Back Pain Disability Scale  DATE 11-10  2022 02-27  2023 06-19  2023        Sleep through the night 5 5 1        Turn over in bed 0 1 0        Get out of bed 3 1 0        Make your bed 5 5 0        Put on socks (pantyhose) 5 2 1        Ride in a car 2 1 1        Sit in a chair for several hours 4 2 1        Stand up for 20-30 minutes 1 0 0        Climb one flight of stairs 1 0 0        Walk a few blocks (200-300 yards)  2 0 0        Walk several miles 5 1 0        Run one block (about 50 yards) 5 2 5        Take food out of the refrigerator 2 1 0        Reach up to high shelves 5 5 2        Move a chair 1 4 0        Pull or push heavy doors 1 2 1        Bend over to clean the bathtub 5 5 5        Throw a ball 5 5 1        Carry two bags of groceries 1 1 0        Lift and carry a heavy suitcase 5 5 5        Total score 63 48 23          Review of Diagnostic Studies:  Lumbar spine x-rays and flexion-extension views 11/21/2022: No evidence of listhesis or instability.  MRI of the lumbar spine without contrast 4/25/2022:   L1-L2, L2-L3, L3-L4: No significant canal or foraminal stenosis  L4-L5: Posterior disc bulge, facet hypertrophy, ligamentum flavum hypertrophy contributing to severe canal stenosis with clumping of the nerve roots.  Moderate bilateral foraminal stenosis  L5-S1: Mild disc osteophyte bulging disc. Mild canal stenosis.  Severe bilateral neuroforaminal stenosis with impingement of the exiting L5 nerve roots bilaterally    Review of Systems   All other systems reviewed and are negative.      Patient Active Problem List   Diagnosis     Lumbar stenosis with neurogenic claudication    Adhesive arachnoiditis    Ligamentum flavum hypertrophy    Degeneration of lumbar or lumbosacral intervertebral disc    Spondylosis of lumbar region without myelopathy or radiculopathy    Anxiety and depression    Moderate obesity    Arthritis       Past Medical History:   Diagnosis Date    Anemia     Arthritis     Back problem     Bladder infection     Diabetes     Gestational diabetes 1999    Hypothyroidism 2000    Thyroid disease     Type 2 diabetes mellitus          Past Surgical History:   Procedure Laterality Date     SECTION      X2 2004, 2007    CHOLECYSTECTOMY  2019    HYSTERECTOMY  2019         Family History   Problem Relation Age of Onset    Cancer Mother     Obesity Mother     Alcohol abuse Father     Kidney disease Father     Cirrhosis Father     Diabetes Maternal Aunt          Social History     Socioeconomic History    Marital status:    Tobacco Use    Smoking status: Never   Substance and Sexual Activity    Alcohol use: Never    Drug use: Yes     Frequency: 3.0 times per week     Types: Marijuana    Sexual activity: Not Currently     Partners: Male     Birth control/protection: Post-menopausal, Hysterectomy           Current Outpatient Medications:     atorvastatin (LIPITOR) 20 MG tablet, , Disp: , Rfl:     Continuous Blood Gluc Sensor (Dexcom G6 Sensor), Apply  topically Every 10 (Ten) Days., Disp: 9 each, Rfl: 3    Continuous Blood Gluc Transmit (Dexcom G6 Transmitter) misc, 1 each by Other route Every 3 (Three) Months., Disp: 1 each, Rfl: 3    Cosentyx Sensoready Pen 150 MG/ML solution auto-injector, 300 mg., Disp: , Rfl:     desvenlafaxine (PRISTIQ) 100 MG 24 hr tablet, Take 1 tablet by mouth Daily., Disp: , Rfl:     eszopiclone (LUNESTA) 3 MG tablet, eszopiclone 3 mg tablet, Disp: , Rfl:     Jardiance 25 MG tablet tablet, Take 1 tablet by mouth Daily., Disp: 90 tablet, Rfl: 1    levothyroxine (SYNTHROID, LEVOTHROID) 88 MCG  "tablet, Take 1 tablet by mouth Daily., Disp: 30 tablet, Rfl: 5    Magnesium Gluconate (MAGNESIUM 27 PO), Take  by mouth., Disp: , Rfl:     meloxicam (MOBIC) 15 MG tablet, , Disp: , Rfl:     metFORMIN (GLUCOPHAGE) 1000 MG tablet, Take 1 tablet by mouth 2 (Two) Times a Day With Meals., Disp: 360 tablet, Rfl: 1    omeprazole (priLOSEC) 40 MG capsule, Take 1 capsule by mouth Daily., Disp: , Rfl:     oxybutynin XL (DITROPAN XL) 15 MG 24 hr tablet, , Disp: , Rfl:     pregabalin (LYRICA) 150 MG capsule, , Disp: , Rfl:     Tirzepatide (Mounjaro) 5 MG/0.5ML solution pen-injector, Inject 0.5 mL under the skin into the appropriate area as directed Every 7 (Seven) Days., Disp: 2 mL, Rfl: 1    tiZANidine (ZANAFLEX) 4 MG tablet, , Disp: , Rfl:     ALPRAZolam (Xanax) 0.5 MG tablet, take 2 tablets 60 minutes prior to procedure, may repeat 1 tablet 30 minutes prior to procedure, Disp: 3 tablet, Rfl: 0    Dietary Management Product (Rheumate) capsule, Take 1 capsule by mouth Daily., Disp: 90 capsule, Rfl: 1    ibuprofen (ADVIL,MOTRIN) 800 MG tablet, , Disp: , Rfl:     Otezla 30 MG tablet, , Disp: , Rfl:     sodium-potassium-magnesium sulfates (Suprep Bowel Prep Kit) 17.5-3.13-1.6 GM/177ML solution oral solution, Use as directed by provider for colonoscopy prep (Patient not taking: Reported on 6/19/2023), Disp: 354 mL, Rfl: 0      Allergies   Allergen Reactions    Aripiprazole Confusion    Lithium Confusion         Resp 16   Ht 162.6 cm (64\")   Wt 78 kg (172 lb)   BMI 29.52 kg/m²       Physical Exam:  Constitutional: Patient appears well-developed, well-nourished, well-hydrated, appears younger than stated age  HEENT: Head: Normocephalic and atraumatic  Eyes: Conjunctivae and lids are normal  Pupils: Equal, round, reactive to light  Peripheral vascular exam: Posterior tibialis: right 2+ and left 2+. Dorsalis pedis: right 2+ and left 2+. No edema.   Musculoskeletal   Gait and station: Gait evaluation demonstrated a normal " gait  Lumbar spine: Passive and active range of motion are improved.  Extension, flexion, and rotation of the lumbar spine did not increase or reproduce pain today. Lumbar facet joint loading maneuvers are equivocal.  Jordy test, Yeomans, SI compression, and Gaenslen's test are negative   Piriformis maneuvers are negative   Hip joints: The range of motion of the hip joints is almost full and without pain   Palpation of the bilateral greater trochanter, unrevealing   Examination of the Iliotibial band: unrevealing   Neurological:   Patient is alert and oriented to person, place, and time.   Speech: Normal.   Cortical function: Normal mental status.   Reflex Scores:  Right patellar: 1+  Left patellar: 1+  Right Achilles: 1+  Left Achilles: 1+  Motor strength: 5/5  Motor Tone: Normal  Involuntary movements: None.   Superficial/Primitive Reflexes: Primitive reflexes were absent.   Right Corey: Absent  Left Corey: Absent  Right ankle clonus: Absent  Left ankle clonus: Absent   Babinsky: Absent  Long tract signs: Negative. Straight leg raising test: Negative. Femoral stretch sign: Negative.   Sensory exam: Intact to light touch, intact pain and temperature sensation, intact vibration sensation and normal proprioception.   Coordination: Normal finger to nose and heel to shin. Normal balance and negative Romberg's sign   Skin and subcutaneous tissue: Skin is warm and intact. No rash noted. No cyanosis.   Psychiatric: Judgment and insight: Normal. Recent and remote memory: Intact. Mood and affect: Normal.     ASSESSMENT:   1. Lumbar stenosis with neurogenic claudication    2. Adhesive arachnoiditis    3. Ligamentum flavum hypertrophy    4. Spondylosis of lumbar region without myelopathy or radiculopathy    5. Degeneration of lumbar or lumbosacral intervertebral disc    6. Anxiety and depression          PLAN/MEDICAL DECISION MAKING:  Ms. Sammie Lim, 48 y.o. female  reports a longstanding history of chronic  lower back pain, which began without incident.  An MRI of the lumbar spine without contrast on 4/25/2022, revealed a posterior disc bulge with facet hypertrophy and ligamentum flavum thickening which contributes to severe spinal canal with clumping of the nerve roots at L4-L5 and moderate bilateral neuroforaminal stenosis.  L5-S1 there is a disc osteophyte complex with disc bulge creating mild canal stenosis with severe bilateral neuroforaminal stenosis.  She underwent neurosurgical consultation with Valarie Kebede PA-C on 10/20/2022, and most recently follow-up consultation with Kacey Bourgeois PA-C on 1/9/2023, and was found not to be a surgical candidate.  Patient has failed to obtain pain relief with conservative measures including oral analgesics (ibuprofen, Lyrica, etc), marijuana (some relief), topical analgesics, heat, physical therapy, physical therapist directed home exercise program (ongoing), water therapy in the past, to name a few.  Currently, patient is doing very well, she is walking a significant amount, she will follow-up as needed.  I have encouraged her to contact me via [employer], if she has any questions or needs a follow-up for recurrence of symptoms.  I have reviewed all available patient's medical records as well as previous therapies as referenced above. I had a lengthy conversation with Ms. Sammie Lim regarding her chronic pain condition and potential therapeutic options including risks, benefits, alternative therapies, to name a few. Therefore, I have proposed the following plan:  1. Interventional pain management measures: None indicated at this time.  Follow-up on as-needed basis.  Depending on continued response, we may consider repeating bilateral L5-S1 versus bilateral L4-L5 transforaminal epidural steroid injections pending on distribution of pain.  We may repeat epidural depending on patient's outcome and distribution of pain.  Patient has recently been found not to be a  surgical candidate, therefore she could be a potential candidate for spinal cord stimulator trial if failure to achieve sustained relief from minimally invasive procedures.  We have briefly discussed this modality today.  2. Diagnostic studies:  A. We may consider EMG/NCV of the bilateral lower extremities depending on continued response  3. Pharmacological measures: Reviewed and discussed;   A. Patient takes ibuprofen, Voltaren gel, Lyrica, tizanidine. Patient also takes Pristiq   B. Continue Rheumate one tablet once daily   4. Long-term rehabilitation efforts:  A. The patient does not have a history of falls. I did complete a risk assessment for falls.   B. Patient will start a comprehensive physical therapy program for Alter-G, core strengthening, gait and balance training, neurodynamics, E-STIM, myofascial release, cupping, dry needling, home exercise program if pain recurs  C. Start an exercise program such as water therapy and swimming.  Continue daily walks for fitness  D. Contrast therapy: Apply ice-packs for 15-20 minutes, followed by heating pads for 15-20 minutes to affected area   E. Patient's Body mass index is 29 kg/m². Patient counseled on the importance of weight loss to help with overall health and pain control.  She is actively engaging in weight loss.  5. The patient has been instructed to contact my office with any questions or difficulties. The patient understands the plan and agrees to proceed accordingly.        Pain Medications               desvenlafaxine (PRISTIQ) 100 MG 24 hr tablet Take 1 tablet by mouth Daily.    meloxicam (MOBIC) 15 MG tablet     pregabalin (LYRICA) 150 MG capsule     tiZANidine (ZANAFLEX) 4 MG tablet     ibuprofen (ADVIL,MOTRIN) 800 MG tablet     Otezla 30 MG tablet            Please note that portions of this note were completed with a voice recognition program.     TESSY Gatica    Patient Care Team:  Geetha Norton as PCP - General (Family  Medicine)  Reese Milton MD as Consulting Physician (Pain Medicine)  Bouchra Chao DO as Consulting Physician (Endocrinology)  Alise Lynn MD as Consulting Physician (Rheumatology)  Anabel Avina APRN as Nurse Practitioner (Pain Medicine)     No orders of the defined types were placed in this encounter.        Future Appointments   Date Time Provider Department Center   6/19/2023  3:20 PM [facility] SCREENING [facility] 60 [facility]   7/10/2023 12:45 PM Bouchra Chao DO [facility]   8/14/2023  1:00 PM Trang Graham APRN [facility]   8/21/2023  8:00 AM Galo Champagne MD [facility] 1780 [facility]

## 2023-07-24 ENCOUNTER — HOSPITAL ENCOUNTER (OUTPATIENT)
Dept: MAMMOGRAPHY | Facility: HOSPITAL | Age: 48
Discharge: HOME | End: 2023-07-24
Payer: COMMERCIAL

## 2023-07-24 ENCOUNTER — APPOINTMENT (OUTPATIENT)
Dept: OTHER | Facility: HOSPITAL | Age: 48
End: 2023-07-24
Payer: COMMERCIAL

## 2023-07-24 DIAGNOSIS — Z12.31 SCREENING MAMMOGRAM FOR BREAST CANCER: ICD-10-CM

## 2023-07-24 DIAGNOSIS — Z12.31 VISIT FOR SCREENING MAMMOGRAM: ICD-10-CM

## 2023-07-24 PROCEDURE — 77063 BREAST TOMOSYNTHESIS BI: CPT

## 2023-07-24 PROCEDURE — 77067 SCR MAMMO BI INCL CAD: CPT

## 2023-08-04 DIAGNOSIS — Z79.4 TYPE 2 DIABETES MELLITUS WITH HYPERGLYCEMIA, WITH LONG-TERM CURRENT USE OF INSULIN: ICD-10-CM

## 2023-08-04 DIAGNOSIS — E11.65 TYPE 2 DIABETES MELLITUS WITH HYPERGLYCEMIA, WITH LONG-TERM CURRENT USE OF INSULIN: ICD-10-CM

## 2023-08-04 RX ORDER — TIRZEPATIDE 7.5 MG/.5ML
7.5 INJECTION, SOLUTION SUBCUTANEOUS
Qty: 2 ML | Refills: 1 | Status: SHIPPED | OUTPATIENT
Start: 2023-08-04 | End: 2023-08-11 | Stop reason: SDUPTHER

## 2023-08-09 ENCOUNTER — TELEPHONE (OUTPATIENT)
Dept: GASTROENTEROLOGY | Facility: CLINIC | Age: 48
End: 2023-08-09
Payer: COMMERCIAL

## 2023-08-09 RX ORDER — SODIUM, POTASSIUM,MAG SULFATES 17.5-3.13G
SOLUTION, RECONSTITUTED, ORAL ORAL
Qty: 354 ML | Refills: 0 | Status: SHIPPED | OUTPATIENT
Start: 2023-08-09

## 2023-08-09 RX ORDER — SOD SULF/POT CHLORIDE/MAG SULF 1.479 G
1 TABLET ORAL ONCE
Qty: 24 TABLET | Refills: 0 | Status: SHIPPED | OUTPATIENT
Start: 2023-08-09 | End: 2023-08-09

## 2023-08-09 NOTE — TELEPHONE ENCOUNTER
----- Message from Sammie Lim sent at 8/9/2023 12:10 PM EDT -----  Regarding: Bowel Prep  Contact: [phone]  Wrong bowel prep was called into [facility]. I need the pills as the liquid solutions have made me sick both times. Ty

## 2023-08-11 DIAGNOSIS — Z79.4 TYPE 2 DIABETES MELLITUS WITH HYPERGLYCEMIA, WITH LONG-TERM CURRENT USE OF INSULIN: ICD-10-CM

## 2023-08-11 DIAGNOSIS — E11.65 TYPE 2 DIABETES MELLITUS WITH HYPERGLYCEMIA, WITH LONG-TERM CURRENT USE OF INSULIN: ICD-10-CM

## 2023-08-11 NOTE — TELEPHONE ENCOUNTER
----- Message from Sammie Lim sent at 8/11/2023  9:17 AM EDT -----  Regarding: Mounjaro  Contact: [phone]  Yes, please everything to [facility] now. They will be cheaper for anything out of pocket with the Good Rx card. I have enough Sensors for the rest of the month.

## 2023-08-11 NOTE — TELEPHONE ENCOUNTER
Rx Refill Note  Requested Prescriptions     Pending Prescriptions Disp Refills    Tirzepatide (Mounjaro) 7.5 MG/0.5ML solution pen-injector 2 mL 1     Sig: Inject 0.5 mL under the skin into the appropriate area as directed Every 7 (Seven) Days.    Continuous Blood Gluc Transmit (Dexcom G6 Transmitter) misc 1 each 3     Si each by Other route Every 3 (Three) Months.        Last office visit with prescribing clinician: 7/10/2023      Next office visit with prescribing clinician: 2023       Lora Joshi (Jodi)  23, 11:44 EDT

## 2023-08-14 RX ORDER — PROCHLORPERAZINE 25 MG/1
1 SUPPOSITORY RECTAL
Qty: 1 EACH | Refills: 3 | Status: SHIPPED | OUTPATIENT
Start: 2023-08-14

## 2023-08-14 RX ORDER — TIRZEPATIDE 7.5 MG/.5ML
7.5 INJECTION, SOLUTION SUBCUTANEOUS
Qty: 2 ML | Refills: 3 | Status: SHIPPED | OUTPATIENT
Start: 2023-08-14

## 2023-08-21 ENCOUNTER — OUTSIDE FACILITY SERVICE (OUTPATIENT)
Dept: GASTROENTEROLOGY | Facility: CLINIC | Age: 48
End: 2023-08-21
Payer: COMMERCIAL

## 2023-08-21 PROCEDURE — 45378 DIAGNOSTIC COLONOSCOPY: CPT | Performed by: INTERNAL MEDICINE

## 2023-08-23 DIAGNOSIS — Z79.4 TYPE 2 DIABETES MELLITUS WITH HYPERGLYCEMIA, WITH LONG-TERM CURRENT USE OF INSULIN: ICD-10-CM

## 2023-08-23 DIAGNOSIS — E11.65 TYPE 2 DIABETES MELLITUS WITH HYPERGLYCEMIA, WITH LONG-TERM CURRENT USE OF INSULIN: ICD-10-CM

## 2023-08-23 RX ORDER — TIRZEPATIDE 7.5 MG/.5ML
7.5 INJECTION, SOLUTION SUBCUTANEOUS
Qty: 2 ML | Refills: 3 | Status: CANCELLED | OUTPATIENT
Start: 2023-08-23

## 2023-08-24 ENCOUNTER — PRIOR AUTHORIZATION (OUTPATIENT)
Dept: ENDOCRINOLOGY | Facility: CLINIC | Age: 48
End: 2023-08-24
Payer: COMMERCIAL

## 2023-08-24 ENCOUNTER — TELEPHONE (OUTPATIENT)
Dept: ENDOCRINOLOGY | Facility: CLINIC | Age: 48
End: 2023-08-24
Payer: COMMERCIAL

## 2023-08-24 NOTE — TELEPHONE ENCOUNTER
PATIENT IS REQUESTING A RETURN CALL FROM CLINIC STAFF. PATIENT STATES THAT SHE SPOKE WITH HER PHARMACY AND WANTED TO RELAY INFORMATION. NO FURTHER DETAILS GIVEN DURING CALL.     CALL BACK [phone]

## 2023-08-24 NOTE — TELEPHONE ENCOUNTER
Spoke with patient.  Pharmacy is stating mounjaro needs PA.  PA completed multiple times on CMM and it states available without authorization.  Patient is going to contact her insurance company.

## 2023-08-25 ENCOUNTER — TELEPHONE (OUTPATIENT)
Dept: ENDOCRINOLOGY | Facility: CLINIC | Age: 48
End: 2023-08-25
Payer: COMMERCIAL

## 2023-08-25 DIAGNOSIS — Z79.4 TYPE 2 DIABETES MELLITUS WITH HYPERGLYCEMIA, WITH LONG-TERM CURRENT USE OF INSULIN: ICD-10-CM

## 2023-08-25 DIAGNOSIS — E11.65 TYPE 2 DIABETES MELLITUS WITH HYPERGLYCEMIA, WITH LONG-TERM CURRENT USE OF INSULIN: ICD-10-CM

## 2023-08-25 RX ORDER — TIRZEPATIDE 7.5 MG/.5ML
7.5 INJECTION, SOLUTION SUBCUTANEOUS
Qty: 2 ML | Refills: 3 | Status: SHIPPED | OUTPATIENT
Start: 2023-08-25

## 2023-08-25 NOTE — TELEPHONE ENCOUNTER
"----- Message from Sammie Lim sent at 8/25/2023  9:17 AM EDT -----  Regarding: PA for Mounjaro  Contact: [phone]  I was on the phone for over an hour yesterday with [employer] \"specialty\" clinic.  It  got me thinking that we never had this problem with [facility].  Can we send the refill to [facility] and see what happens with them? It wont be a new script there. [facility] on [address] if you wanna try there again. Thanks  Sammie"

## 2023-10-18 ENCOUNTER — TELEPHONE (OUTPATIENT)
Dept: PAIN MEDICINE | Facility: CLINIC | Age: 48
End: 2023-10-18
Payer: COMMERCIAL

## 2023-10-18 DIAGNOSIS — M48.062 LUMBAR STENOSIS WITH NEUROGENIC CLAUDICATION: Primary | ICD-10-CM

## 2023-10-18 NOTE — TELEPHONE ENCOUNTER
----- Message from Reese Milton MD sent at 10/12/2023  9:53 PM EDT -----  Regarding: FW: Injection/Questions  Contact: [phone]  I read Anabel's F/U note. I think that we could schedule her for repeat TESI (L5-S1). She had @L4-L5 in 2022, and @ L5-S1 in 3/2023. The last one provided 90% relief for 5 months.   ----- Message -----  From: Susan Phan MA  Sent: 10/12/2023   4:19 PM EDT  To: Reese Miltno MD  Subject: FW: Injection/Questions                            ----- Message -----  From: Sammie Lim  Sent: 10/12/2023   4:18 PM EDT  To: [facility]  Subject: Injection/Questions                              #Ins is still the same.  #Yes the pain is the same that I remember shooting down my legs pain in the lower middle back.  #The pain started to return about a month ago slowly I thought I could handle it. Started taking Tylenol again a few weeks ago.  Its getting worse it feels like. The Tylenol isn't working.  I guess that would make it close to 5 months? It took almost all of my back pain the last one. I would say 90% for the window for sure.  #No therapy however I do try and walk everyday and have lost close to 80lbs  I believe.

## 2023-10-26 DIAGNOSIS — Z79.4 TYPE 2 DIABETES MELLITUS WITH HYPERGLYCEMIA, WITH LONG-TERM CURRENT USE OF INSULIN: Primary | ICD-10-CM

## 2023-10-26 DIAGNOSIS — M48.062 LUMBAR STENOSIS WITH NEUROGENIC CLAUDICATION: Primary | ICD-10-CM

## 2023-10-26 DIAGNOSIS — E11.65 TYPE 2 DIABETES MELLITUS WITH HYPERGLYCEMIA, WITH LONG-TERM CURRENT USE OF INSULIN: Primary | ICD-10-CM

## 2023-10-26 RX ORDER — TIRZEPATIDE 10 MG/.5ML
10 INJECTION, SOLUTION SUBCUTANEOUS
Qty: 2 ML | Refills: 1 | Status: SHIPPED | OUTPATIENT
Start: 2023-10-26

## 2023-10-30 ENCOUNTER — OUTSIDE FACILITY SERVICE (OUTPATIENT)
Dept: PAIN MEDICINE | Facility: CLINIC | Age: 48
End: 2023-10-30
Payer: COMMERCIAL

## 2023-10-30 PROCEDURE — 64483 NJX AA&/STRD TFRM EPI L/S 1: CPT | Performed by: ANESTHESIOLOGY

## 2023-10-30 PROCEDURE — 99152 MOD SED SAME PHYS/QHP 5/>YRS: CPT | Performed by: ANESTHESIOLOGY

## 2023-12-20 DIAGNOSIS — Z79.4 TYPE 2 DIABETES MELLITUS WITH HYPERGLYCEMIA, WITH LONG-TERM CURRENT USE OF INSULIN: ICD-10-CM

## 2023-12-20 DIAGNOSIS — E11.65 TYPE 2 DIABETES MELLITUS WITH HYPERGLYCEMIA, WITH LONG-TERM CURRENT USE OF INSULIN: ICD-10-CM

## 2023-12-21 RX ORDER — TIRZEPATIDE 10 MG/.5ML
INJECTION, SOLUTION SUBCUTANEOUS
Qty: 2 ML | Refills: 1 | Status: SHIPPED | OUTPATIENT
Start: 2023-12-21

## 2023-12-21 NOTE — TELEPHONE ENCOUNTER
Rx Refill Note  Requested Prescriptions     Pending Prescriptions Disp Refills    Mounjaro 10 MG/0.5ML solution pen-injector pen [Pharmacy Med Name: MOUNJARO 10 MG/0.5 ML PEN] 2 mL 1     Sig: INJECT 10 MG UNDER THE SKIN ONCE WEEKLY      Last office visit with prescribing clinician: 7/10/2023     Next office visit with prescribing clinician: 2/5/2024       Tushar Cortez MA  12/21/23, 12:40 EST

## 2024-01-09 ENCOUNTER — TELEPHONE (OUTPATIENT)
Dept: PAIN MEDICINE | Facility: CLINIC | Age: 49
End: 2024-01-09
Payer: COMMERCIAL

## 2024-01-09 NOTE — TELEPHONE ENCOUNTER
Provider: LUKE    Caller: MICHELLE ANDERSON    Relationship to Patient: PATIENT    Pharmacy: N/A    Phone Number: [phone] - THIS NUMBER [phone] OR [employer] [facility]    Reason for Call: PATIENT IS HAVING NEW PAIN AND NEEDS TO BE SEEN BEFORE NEXT AVAILABLE/UPCOMING APPT    When was the patient last seen: 10.30.23    When did it start: 2 WEEKS    Where is it located:  RIGHT SIDE LOWER MIDDLE BACK    Characteristics of symptom/severity:  8/10    Timing- Is it constant or intermittent: CONSTANT STABBING PAIN       What makes it better: NO

## 2024-01-10 NOTE — TELEPHONE ENCOUNTER
"LVM for pt to advise nothing sooner available and to keep  current appointment of 1/29.    Relay     \"THERE IS NOTHING SOONER AVAILABLE, PLEASE KEEP YOUR APPOINTMENT OF 1/29/24.\"      "

## 2024-01-11 NOTE — TELEPHONE ENCOUNTER
Pt called back and is inquiring if there is something that she can take at work to help her to be able to get through her work shift, she advised that Tylenol/NSAIDS just don't cut it. Please advise.

## 2024-01-29 ENCOUNTER — OFFICE VISIT (OUTPATIENT)
Dept: PAIN MEDICINE | Facility: CLINIC | Age: 49
End: 2024-01-29
Payer: COMMERCIAL

## 2024-01-29 VITALS — WEIGHT: 144.4 LBS | HEIGHT: 64 IN | BODY MASS INDEX: 24.65 KG/M2 | TEMPERATURE: 96.8 F

## 2024-01-29 DIAGNOSIS — M47.816 SPONDYLOSIS OF LUMBAR REGION WITHOUT MYELOPATHY OR RADICULOPATHY: ICD-10-CM

## 2024-01-29 DIAGNOSIS — G03.9 ADHESIVE ARACHNOIDITIS: ICD-10-CM

## 2024-01-29 DIAGNOSIS — M48.062 LUMBAR STENOSIS WITH NEUROGENIC CLAUDICATION: Primary | ICD-10-CM

## 2024-01-29 DIAGNOSIS — F41.9 ANXIETY AND DEPRESSION: ICD-10-CM

## 2024-01-29 DIAGNOSIS — F32.A ANXIETY AND DEPRESSION: ICD-10-CM

## 2024-01-29 DIAGNOSIS — M48.062 LUMBAR STENOSIS WITH NEUROGENIC CLAUDICATION: ICD-10-CM

## 2024-01-29 DIAGNOSIS — M24.28 LIGAMENTUM FLAVUM HYPERTROPHY: ICD-10-CM

## 2024-01-29 PROCEDURE — 99214 OFFICE O/P EST MOD 30 MIN: CPT | Performed by: NURSE PRACTITIONER

## 2024-01-29 RX ORDER — UPADACITINIB 15 MG/1
TABLET, EXTENDED RELEASE ORAL
COMMUNITY
Start: 2024-01-20

## 2024-01-29 RX ORDER — BUSPIRONE HYDROCHLORIDE 10 MG/1
TABLET ORAL
COMMUNITY
Start: 2023-12-08

## 2024-01-29 RX ORDER — SOLIFENACIN SUCCINATE 10 MG/1
TABLET, FILM COATED ORAL
COMMUNITY
Start: 2024-01-16

## 2024-01-29 RX ORDER — DOXYCYCLINE HYCLATE 100 MG/1
CAPSULE ORAL
COMMUNITY
Start: 2024-01-03 | End: 2024-02-05

## 2024-01-29 RX ORDER — METHOCARBAMOL 750 MG/1
750 TABLET, FILM COATED ORAL 3 TIMES DAILY PRN
Qty: 90 TABLET | Refills: 0 | Status: SHIPPED | OUTPATIENT
Start: 2024-01-29

## 2024-01-29 RX ORDER — ALPRAZOLAM 0.5 MG/1
TABLET ORAL
Qty: 3 TABLET | Refills: 0 | Status: SHIPPED | OUTPATIENT
Start: 2024-01-29

## 2024-01-29 NOTE — PROGRESS NOTES
"Chief Complaint: \".\"        History of Present Illness:   Patient: Ms. Sammie Lim, 48 y.o. female originally referred by Valarie Kebede PA-C in consultation for chronic intractable lower back pain.  Patient reports a longstanding history of chronic lower back pain, which began without incident.  An MRI of the lumbar spine without contrast on 4/25/2022, revealed a posterior disc bulge with facet hypertrophy and ligamentum flavum thickening which contributes to severe spinal canal with clumping of the nerve roots at L4-L5 and moderate bilateral neuroforaminal stenosis.  L5-S1 there is a disc osteophyte complex with disc bulge creating mild canal stenosis with severe bilateral neuroforaminal stenosis.  She underwent neurosurgical consultation with Valarie Kebede PA-C on 10/20/2022, and follow-up consultation with Kacey Bourgeois PA-C on 1/9/2023, and was found not to be a surgical candidate.  She was last seen on October 30, 2023 when she underwent repeat repeat bilateral L5-S1 transforaminal epidural steroid injection, from which she reports experiencing no significant reduction in her pain.  Although, today she is reporting pain radiating into her hips, lateral thighs and into her calves and feet, most consistent today with a L4-L5 distribution.  She has previously undergone a bilateral L4-L5 transforaminal epidural steroid injection in December 2022, which has provided her with significant improvements in this pain.  Prior to that on March 15, 2023, when she underwent diagnostic and therapeutic bilateral L5-S1 transforaminal epidural steroid injections, from which she reported experiencing almost complete pain relief and functional improvement lasting almost 7 months.   She returns today for postprocedure follow-up and evaluation.  Pain Description: constant pain with intermittent exacerbation, described as aching, shocking, dull, sharp, and shooting sensation.   Radiation of Pain: The radiates into the " bilateral gluteal region, bilateral hips, and lateral thighs, into her calves and feet  Pain intensity today: 6/10  Average pain intensity last week: 6/10  Pain intensity ranges from: 2/10 to 8/10  Aggravating factors: Pain increases with protracted sitting, bending, twisting, standing, walking.     Alleviating factors: Pain decreases with heat, lying down (pain free mostly)  Associated Symptoms:   Patient reports pain, tingling and weakness in the lower extremities.   Patient denies any new bladder or bowel problems.   Patient denies difficulties with her balance or recent falls.   Pain no longer interferes with ADLs, general activities (ability to walk, stand, transition from different positions)  Pain interferes with sleep causing sleep fragmentation       Review of previous therapies and additional medical records:  Sammie Lim has already failed the following measures, including:   Conservative Measures: Oral analgesics, marijuana, topical analgesics, heat, physical therapy, physical therapist directed home exercise program (ongoing), water therapy in the past  Interventional Measures: 12/12/2022: DxTx bilateral L4-L5 transforaminal epidural steroid injections  03/15/2023: DxTx bilateral L5-S1 transforaminal epidural steroid injections  10/30/2023: bilateral L5-S1 transforaminal epidural steroid injections  Surgical Measures: No history of previous cervical spine, lumbar spine or hip surgery  Sammie Lim underwent neurosurgical consultation with Valarie Kebede PA-C on 10/20/2022, and was found not to be a surgical candidate. Most recently follow-up consultation with Kacey Bourgeois PA-C on 1/9/2023, and was found not to be a surgical candidate.   Sammie Lim presents with significant comorbidities including Diabetes, hypothyroidism, depression  In terms of current analgesics, Sammie Lim takes: ibuprofen, Voltaren gel, Lyrica, tizanidine. Patient also takes Pristiq    I have reviewed Samuel Report consistent with medication reconciliation.  SOAPP: Not completed by the patient        Global Pain Scale 11-10  2022 02-27  2023 06-19  85185 01-29  2024       Pain 12 10 2 15       Feelings 18 12 3 24       Clinical outcomes 10 12 1 17       Activities 20 12 0 12       GPS Total: 60 56 6 68         The [place] Back Pain Disability Scale  DATE 11-10  2022 02-27  2023 06-19  2023 01-29  2024       Sleep through the night 5 5 1 2       Turn over in bed 0 1 0 0       Get out of bed 3 1 0 0       Make your bed 5 5 0 0       Put on socks (pantyhose) 5 2 1 0       Ride in a car 2 1 1 4       Sit in a chair for several hours 4 2 1 4       Stand up for 20-30 minutes 1 0 0 4       Climb one flight of stairs 1 0 0 4       Walk a few blocks (200-300 yards)  2 0 0 0       Walk several miles 5 1 0 2       Run one block (about 50 yards) 5 2 5 0       Take food out of the refrigerator 2 1 0 2       Reach up to high shelves 5 5 2 4       Move a chair 1 4 0 4       Pull or push heavy doors 1 2 1 5       Bend over to clean the bathtub 5 5 5 0       Throw a ball 5 5 1 0       Carry two bags of groceries 1 1 0 0       Lift and carry a heavy suitcase 5 5 5 5       Total score 63 48 23 40         Review of Diagnostic Studies:  Lumbar spine x-rays and flexion-extension views 11/21/2022: No evidence of listhesis or instability.  MRI of the lumbar spine without contrast 4/25/2022:   L1-L2, L2-L3, L3-L4: No significant canal or foraminal stenosis  L4-L5: Posterior disc bulge, facet hypertrophy, ligamentum flavum hypertrophy contributing to severe canal stenosis with clumping of the nerve roots.  Moderate bilateral foraminal stenosis  L5-S1: Mild disc osteophyte bulging disc. Mild canal stenosis.  Severe bilateral neuroforaminal stenosis with impingement of the exiting L5 nerve roots bilaterally    Review of Systems   Musculoskeletal:  Positive for arthralgias, back pain and myalgias.   Psychiatric/Behavioral:   The patient is nervous/anxious.    All other systems reviewed and are negative.        Patient Active Problem List   Diagnosis    Lumbar stenosis with neurogenic claudication    Adhesive arachnoiditis    Ligamentum flavum hypertrophy    Degeneration of lumbar or lumbosacral intervertebral disc    Spondylosis of lumbar region without myelopathy or radiculopathy    Anxiety and depression    Moderate obesity    Arthritis       Past Medical History:   Diagnosis Date    Anemia     Arthritis     Back problem     Bladder infection     Diabetes     Gestational diabetes 1999    Hypothyroidism 2000    Thyroid disease     Type 2 diabetes mellitus          Past Surgical History:   Procedure Laterality Date     SECTION      X2 ,     CHOLECYSTECTOMY  2019    HYSTERECTOMY  2019    OOPHORECTOMY  2019    UNILATERAL         Family History   Problem Relation Age of Onset    Cancer Mother     Obesity Mother     Alcohol abuse Father     Kidney disease Father     Cirrhosis Father     Diabetes Maternal Aunt     Breast cancer Maternal Aunt         DX AGE UNKNOWN    Ovarian cancer Neg Hx          Social History     Socioeconomic History    Marital status:    Tobacco Use    Smoking status: Never   Vaping Use    Vaping Use: Never used   Substance and Sexual Activity    Alcohol use: Never    Drug use: Yes     Frequency: 3.0 times per week     Types: Marijuana    Sexual activity: Not Currently     Partners: Male     Birth control/protection: Post-menopausal, Hysterectomy           Current Outpatient Medications:     ALPRAZolam (Xanax) 0.5 MG tablet, take 2 tablets 60 minutes prior to procedure, may repeat 1 tablet 30 minutes prior to procedure, Disp: 3 tablet, Rfl: 0    atorvastatin (LIPITOR) 20 MG tablet, , Disp: , Rfl:     busPIRone (BUSPAR) 10 MG tablet, , Disp: , Rfl:     Continuous Blood Gluc Sensor (Dexcom G6 Sensor), Apply  topically Every 10 (Ten) Days., Disp: 9 each, Rfl: 3    Continuous Blood Gluc  Transmit (Dexcom G6 Transmitter) misc, 1 each by Other route Every 3 (Three) Months., Disp: 1 each, Rfl: 3    desvenlafaxine (PRISTIQ) 100 MG 24 hr tablet, Take 1 tablet by mouth Daily., Disp: , Rfl:     Dietary Management Product (Rheumate) capsule, Take 1 capsule by mouth Daily., Disp: 90 capsule, Rfl: 1    doxycycline (VIBRAMYCIN) 100 MG capsule, , Disp: , Rfl:     eszopiclone (LUNESTA) 3 MG tablet, eszopiclone 3 mg tablet, Disp: , Rfl:     glucose blood test strip, Check glucose twice daily, Disp: 200 each, Rfl: 3    glucose monitor monitoring kit, 1 each 2 (Two) Times a Day. Onetouch Verio Reflect preferred, Disp: 1 each, Rfl: 0    Jardiance 25 MG tablet tablet, Take 1 tablet by mouth Daily., Disp: 90 tablet, Rfl: 1    levothyroxine (SYNTHROID, LEVOTHROID) 88 MCG tablet, Take 1 tablet by mouth Daily., Disp: 90 tablet, Rfl: 3    Magnesium Gluconate (MAGNESIUM 27 PO), Take  by mouth., Disp: , Rfl:     meloxicam (MOBIC) 15 MG tablet, , Disp: , Rfl:     metFORMIN (GLUCOPHAGE) 1000 MG tablet, Take 1 tablet by mouth 2 (Two) Times a Day With Meals., Disp: 360 tablet, Rfl: 1    Mounjaro 10 MG/0.5ML solution pen-injector pen, INJECT 10 MG UNDER THE SKIN ONCE WEEKLY, Disp: 2 mL, Rfl: 1    omeprazole (priLOSEC) 40 MG capsule, Take 1 capsule by mouth Daily., Disp: , Rfl:     Otezla 30 MG tablet, , Disp: , Rfl:     oxybutynin XL (DITROPAN XL) 15 MG 24 hr tablet, 20 mg., Disp: , Rfl:     pregabalin (LYRICA) 150 MG capsule, , Disp: , Rfl:     Rinvoq 15 MG tablet sustained-release 24 hour, , Disp: , Rfl:     sodium-potassium-magnesium sulfates (Suprep Bowel Prep Kit) 17.5-3.13-1.6 GM/177ML solution oral solution, Use as directed by provider for colonoscopy prep, Disp: 354 mL, Rfl: 0    solifenacin (VESICARE) 10 MG tablet, , Disp: , Rfl:     tiZANidine (ZANAFLEX) 4 MG tablet, , Disp: , Rfl:     methocarbamol (ROBAXIN) 750 MG tablet, Take 1 tablet by mouth 3 (Three) Times a Day As Needed for Muscle Spasms., Disp: 90 tablet, Rfl:  "0      Allergies   Allergen Reactions    Aripiprazole Confusion    Lithium Confusion         Temp 96.8 °F (36 °C) (Temporal)   Ht 162.6 cm (64.02\")   Wt 65.5 kg (144 lb 6.4 oz)   BMI 24.77 kg/m²       Physical Exam:  Constitutional: Patient appears well-developed, well-nourished, well-hydrated, appears younger than stated age  HEENT: Head: Normocephalic and atraumatic  Eyes: Conjunctivae and lids are normal  Pupils: Equal, round, reactive to light  Peripheral vascular exam: Posterior tibialis: right 2+ and left 2+. Dorsalis pedis: right 2+ and left 2+. No edema.   Musculoskeletal   Gait and station: Gait evaluation demonstrated some antalgia  Lumbar spine: Passive and active range of motion are limited secondary to pain.  Extension, flexion, and rotation of the lumbar spine increased and reproduced pain. Lumbar facet joint loading maneuvers are positive.  Jordy test, Yeomans, SI compression, and Gaenslen's test are negative   Piriformis maneuvers are negative   Hip joints: The range of motion of the hip joints is almost full and without pain   Palpation of the bilateral greater trochanter, unrevealing   Examination of the Iliotibial band: unrevealing   Neurological:   Patient is alert and oriented to person, place, and time.   Speech: Normal.   Cortical function: Normal mental status.   Reflex Scores:  Right patellar: 1+  Left patellar: 1+  Right Achilles: 1+  Left Achilles: 1+  Motor strength: 5/5  Motor Tone: Normal  Involuntary movements: None.   Superficial/Primitive Reflexes: Primitive reflexes were absent.   Right Corey: Absent  Left Corey: Absent  Right ankle clonus: Absent  Left ankle clonus: Absent   Babinsky: Absent  Long tract signs: Negative. Straight leg raising test: Negative. Femoral stretch sign: Negative.   Sensory exam: Intact to light touch, intact pain and temperature sensation, intact vibration sensation and normal proprioception.   Coordination: Normal finger to nose and heel to shin. " Normal balance and negative Romberg's sign   Skin and subcutaneous tissue: Skin is warm and intact. No rash noted. No cyanosis.   Psychiatric: Judgment and insight: Normal. Recent and remote memory: Intact. Mood and affect: Normal.     ASSESSMENT:   1. Lumbar stenosis with neurogenic claudication    2. Adhesive arachnoiditis    3. Spondylosis of lumbar region without myelopathy or radiculopathy    4. Ligamentum flavum hypertrophy    5. Anxiety and depression            PLAN/MEDICAL DECISION MAKING:  Ms. Sammie Lim, 48 y.o. female  reports a longstanding history of chronic lower back pain, which began without incident.  An MRI of the lumbar spine without contrast on 4/25/2022, revealed a posterior disc bulge with facet hypertrophy and ligamentum flavum thickening which contributes to severe spinal canal with clumping of the nerve roots at L4-L5 and moderate bilateral neuroforaminal stenosis.  L5-S1 there is a disc osteophyte complex with disc bulge creating mild canal stenosis with severe bilateral neuroforaminal stenosis.  She underwent neurosurgical consultation with Valarie Kebede PA-C on 10/20/2022, and most recently follow-up consultation with Kacey Bourgeois PA-C on 1/9/2023, and was found not to be a surgical candidate.  Patient has failed to obtain pain relief with conservative measures including oral analgesics (ibuprofen, Lyrica, etc), marijuana (some relief), topical analgesics, heat, physical therapy, physical therapist directed home exercise program (ongoing), water therapy in the past, to name a few. She was last seen on October 30, 2023 when she underwent repeat repeat bilateral L5-S1 transforaminal epidural steroid injection, from which she reports experiencing no significant reduction in her pain.  Although, today she is reporting pain radiating into her hips, lateral thighs and into her calves and feet, most consistent today with a L4-L5 distribution.  She has previously undergone a  bilateral L4-L5 transforaminal epidural steroid injection in December 2022, which has provided her with significant improvements in this pain.  I have reviewed all available patient's medical records as well as previous therapies as referenced above. I had a lengthy conversation with Ms. Sammie Youngblood Raphael regarding her chronic pain condition and potential therapeutic options including risks, benefits, alternative therapies, to name a few. Therefore, I have proposed the following plan:  1. Interventional pain management measures: Patient will be scheduled for bilateral L4-L5 transforaminal epidural steroid injections.  We may repeat epidural depending on patient's outcome and distribution of pain.  Patient has recently been found not to be a surgical candidate, therefore she could be a potential candidate for spinal cord stimulator trial if failure to achieve sustained relief from minimally invasive procedures.  We have briefly discussed this modality today.  2. Diagnostic studies:  A. EMG/NCV of the bilateral lower extremities depending on continued response  B.  MRI of the lumbar spine without contrast due to progression of symptoms  3. Pharmacological measures: Reviewed and discussed;   A. Patient takes ibuprofen, Voltaren gel, Lyrica, tizanidine. Patient also takes Pristiq   B. Continue Rheumate one tablet once daily   C. Trial with Robaxin 750 mg 3 times daily as needed for muscle spasms, do not take other muscle relaxants  4. Long-term rehabilitation efforts:  A. The patient does not have a history of falls. I did complete a risk assessment for falls.   B. Patient will start a comprehensive physical therapy program for Alter-G, core strengthening, gait and balance training, neurodynamics, E-STIM, myofascial release, cupping, dry needling, home exercise program if pain recurs  C. Start an exercise program such as water therapy and swimming.  Continue daily walks for fitness  D. Contrast therapy: Apply  ice-packs for 15-20 minutes, followed by heating pads for 15-20 minutes to affected area   5. The patient has been instructed to contact my office with any questions or difficulties. The patient understands the plan and agrees to proceed accordingly.        Pain Medications               desvenlafaxine (PRISTIQ) 100 MG 24 hr tablet Take 1 tablet by mouth Daily.    meloxicam (MOBIC) 15 MG tablet     Otezla 30 MG tablet     pregabalin (LYRICA) 150 MG capsule     Rinvoq 15 MG tablet sustained-release 24 hour     tiZANidine (ZANAFLEX) 4 MG tablet     methocarbamol (ROBAXIN) 750 MG tablet Take 1 tablet by mouth 3 (Three) Times a Day As Needed for Muscle Spasms.           Please note that portions of this note were completed with a voice recognition program.     TESSY Gatcia    Patient Care Team:  Geetha Norton as PCP - General (Family Medicine)  Reese Milton MD as Consulting Physician (Pain Medicine)  Bouchra Chao DO as Consulting Physician (Endocrinology)  Alise Lynn MD as Consulting Physician (Rheumatology)  Anabel Avina APRN as Nurse Practitioner (Pain Medicine)     New Medications Ordered This Visit   Medications    methocarbamol (ROBAXIN) 750 MG tablet     Sig: Take 1 tablet by mouth 3 (Three) Times a Day As Needed for Muscle Spasms.     Dispense:  90 tablet     Refill:  0         Future Appointments   Date Time Provider Department Center   2/5/2024  8:00 AM Bouchra Chao DO [facility]

## 2024-01-30 DIAGNOSIS — M48.062 LUMBAR STENOSIS WITH NEUROGENIC CLAUDICATION: Primary | ICD-10-CM

## 2024-02-05 ENCOUNTER — OFFICE VISIT (OUTPATIENT)
Dept: ENDOCRINOLOGY | Facility: CLINIC | Age: 49
End: 2024-02-05
Payer: COMMERCIAL

## 2024-02-05 VITALS
BODY MASS INDEX: 24.75 KG/M2 | WEIGHT: 145 LBS | DIASTOLIC BLOOD PRESSURE: 66 MMHG | HEIGHT: 64 IN | OXYGEN SATURATION: 98 % | SYSTOLIC BLOOD PRESSURE: 108 MMHG | HEART RATE: 87 BPM

## 2024-02-05 DIAGNOSIS — E11.65 TYPE 2 DIABETES MELLITUS WITH HYPERGLYCEMIA, WITH LONG-TERM CURRENT USE OF INSULIN: Primary | ICD-10-CM

## 2024-02-05 DIAGNOSIS — E78.2 MIXED HYPERLIPIDEMIA: ICD-10-CM

## 2024-02-05 DIAGNOSIS — Z79.4 TYPE 2 DIABETES MELLITUS WITH HYPERGLYCEMIA, WITH LONG-TERM CURRENT USE OF INSULIN: Primary | ICD-10-CM

## 2024-02-05 DIAGNOSIS — E03.9 ACQUIRED HYPOTHYROIDISM: ICD-10-CM

## 2024-02-05 PROCEDURE — 99214 OFFICE O/P EST MOD 30 MIN: CPT | Performed by: INTERNAL MEDICINE

## 2024-02-05 RX ORDER — BLOOD-GLUCOSE METER
1 KIT MISCELLANEOUS 2 TIMES DAILY
Qty: 1 EACH | Refills: 0 | Status: SHIPPED | OUTPATIENT
Start: 2024-02-05

## 2024-02-05 RX ORDER — EMPAGLIFLOZIN 25 MG/1
25 TABLET, FILM COATED ORAL DAILY
Qty: 90 TABLET | Refills: 1 | Status: SHIPPED | OUTPATIENT
Start: 2024-02-05

## 2024-02-05 NOTE — PROGRESS NOTES
"Chief Complaint   Patient presents with    Diabetes          HPI   Sammie Lim is a 48 y.o. female had concerns including Diabetes.    She is checking blood sugars 0 times per day.  Current medications for diabetes include metformin 1000 mg BID, jardiance 25 mg daily, mounjaro 10 mg weekly.  Tolerating without issue. Ready for dose increase.  Is snacking on candy at work. No regular sweetened beverages. Doesn't think diet is an issue, it's candy.    A1c at PCPs office recently was 7.1.  Is on atorvastatin 20 mg daily and levothyroxine 88 mcg daily. Dose reduced almost a year ago.     The following portions of the patient's history were reviewed and updated as appropriate: allergies, current medications, past family history, past medical history, past social history, past surgical history, and problem list.      Review of Systems   Constitutional: Negative.    Endocrine: Negative.         Physical Exam  Vitals reviewed.   Constitutional:       Appearance: Normal appearance.   Cardiovascular:      Rate and Rhythm: Normal rate.      Pulses:           Dorsalis pedis pulses are 1+ on the right side and 1+ on the left side.   Pulmonary:      Effort: Pulmonary effort is normal.   Feet:      Right foot:      Skin integrity: Skin integrity normal.      Toenail Condition: Right toenails are normal.      Left foot:      Skin integrity: Skin integrity normal.      Toenail Condition: Left toenails are normal.      Comments: Diabetic Foot Exam Performed and Monofilament Test Performed    Monofilament 5/5 bilaterally  Neurological:      General: No focal deficit present.      Mental Status: She is alert. Mental status is at baseline.   Psychiatric:         Mood and Affect: Mood normal.         Behavior: Behavior normal.        /66   Pulse 87   Ht 162.6 cm (64\")   Wt 65.8 kg (145 lb)   SpO2 98%   BMI 24.89 kg/m²      Labs and imaging    CMP:  Lab Results   Component Value Date    BUN 14 03/03/2023    " CREATININE 0.49 (L) 03/03/2023    EGFR 118.6 03/03/2023    BCR 28.6 (H) 03/03/2023     03/03/2023    K 4.3 03/03/2023    CO2 29.0 03/03/2023    CALCIUM 9.7 03/03/2023    ALBUMIN 4.5 03/03/2023    BILITOT 0.5 03/03/2023    ALKPHOS 56 03/03/2023    AST 16 03/03/2023    ALT 14 03/03/2023     Lipid Panel:  Lab Results   Component Value Date    CHOL 158 11/28/2022    TRIG 60 11/28/2022    HDL 65 (H) 11/28/2022    VLDL 12 11/28/2022    LDL 81 11/28/2022     HbA1c:  Lab Results   Component Value Date    HGBA1C 7.3 07/10/2023    HGBA1C 5.8 03/06/2023     Glucose:  Lab Results   Component Value Date    POCGLU 155 (A) 07/10/2023     Microalbumin:  Lab Results   Component Value Date    MALBCRERATIO  11/28/2022      Comment:      Unable to calculate     TSH:  Lab Results   Component Value Date    TSH 3.480 07/10/2023     1/17/2024 glucose 223, creatinine 0.63, BUN to creatinine ratio 32, , LFTs normal, A1c 7.1, TSH 3.06, vitamin D 39.3    Assessment and plan  Diagnoses and all orders for this visit:    1. Type 2 diabetes mellitus with hyperglycemia, with long-term current use of insulin (Primary)  Uncontrolled with significant postprandial hyperglycemia though A1c is improved to 7.1.  No complications from diabetes.  She is no longer requiring insulin.  Continue metformin 1000 mg twice daily, Jardiance 25 mg daily.  Increase Mounjaro to 12.5 mg weekly.  Titrate up to max dose in a month if needed/tolerated.  She needs to check BGs at least several times per week.  Call the office if persistently greater than 200.  Minimize candy intake.  Monofilament updated today.  Ophtho exam next has been updated yearly and requested the patient have the report sent here.  Labs were updated at PCPs office on 1/17/2024.  Urine microalbumin can be checked at follow-up visit.  -     Tirzepatide (MOUNJARO) 12.5 MG/0.5ML solution pen-injector pen; Inject 0.5 mL under the skin into the appropriate area as directed 1 (One) Time Per  Week.  Dispense: 2 mL; Refill: 1  -     metFORMIN (GLUCOPHAGE) 1000 MG tablet; Take 1 tablet by mouth 2 (Two) Times a Day With Meals.  Dispense: 360 tablet; Refill: 1  -     Jardiance 25 MG tablet tablet; Take 1 tablet by mouth Daily.  Dispense: 90 tablet; Refill: 1  -     glucose monitor monitoring kit; Use 1 each 2 (Two) Times a Day.  Dispense: 1 each; Refill: 0  -     glucose blood test strip; Check glucose twice daily  Dispense: 200 each; Refill: 3    2. Acquired hypothyroidism  Clinically biochemically euthyroid on levothyroxine 88 mcg daily.  Recent TSH in normal range.  Monitor yearly.    3. Mixed hyperlipidemia  On atorvastatin 20 mg daily.  Lipids should be checked yearly.  Can check at follow-up visit if not updated by PCP in the meantime.       Return in about 4 months (around 6/5/2024) for next scheduled follow up. The patient was instructed to contact the clinic with any interval questions or concerns.    Bouchra Chao, DO   Endocrinologist    Please note that portions of this note were completed with a voice recognition program.

## 2024-02-14 DIAGNOSIS — M48.062 LUMBAR STENOSIS WITH NEUROGENIC CLAUDICATION: Primary | ICD-10-CM

## 2024-02-19 ENCOUNTER — OUTSIDE FACILITY SERVICE (OUTPATIENT)
Dept: PAIN MEDICINE | Facility: CLINIC | Age: 49
End: 2024-02-19
Payer: COMMERCIAL

## 2024-02-19 PROCEDURE — 99152 MOD SED SAME PHYS/QHP 5/>YRS: CPT | Performed by: ANESTHESIOLOGY

## 2024-02-19 PROCEDURE — 64483 NJX AA&/STRD TFRM EPI L/S 1: CPT | Performed by: ANESTHESIOLOGY

## 2024-02-22 RX ORDER — TIRZEPATIDE 10 MG/.5ML
10 INJECTION, SOLUTION SUBCUTANEOUS WEEKLY
Qty: 2 ML | Refills: 2 | Status: SHIPPED | OUTPATIENT
Start: 2024-02-22

## 2024-02-26 ENCOUNTER — TELEPHONE (OUTPATIENT)
Dept: PAIN MEDICINE | Facility: CLINIC | Age: 49
End: 2024-02-26
Payer: COMMERCIAL

## 2024-02-26 NOTE — TELEPHONE ENCOUNTER
Spoke with the patient. I advised her that normally it can take up to a week or more to enjoy the benefit of an epidural steroid injection, however, she stated she has been having incontinence of the bladder, which is new for her. I advised her to get checked at an Urgent Care. Do you wish to see the patient in the office?

## 2024-02-28 RX ORDER — METHOCARBAMOL 750 MG/1
750 TABLET, FILM COATED ORAL 3 TIMES DAILY PRN
Qty: 90 TABLET | Refills: 0 | Status: SHIPPED | OUTPATIENT
Start: 2024-02-28

## 2024-03-05 NOTE — TELEPHONE ENCOUNTER
Called the patient and left VM relaying Anabel's message.   Chief complaint:   Chief Complaint   Patient presents with   • Follow-up     ongoing chronic cough        Vitals:  Visit Vitals  /60   Pulse 77   Temp 98.2 °F (36.8 °C)   Resp 18       HISTORY OF PRESENT ILLNESS     HPI  Very pleasant patient comes in for six-month follow-up.  Overall doing well complains of a chronic cough for the last several years.  Has had x-rays in the past that were negative.  No fevers no chills states allergy medications seem to work.  Encouraged her to please take those.    With history of borderline glucoses labs look good.  History of celiac disease again labs overall look good will continue to monitor    Borderline low B12 in the past repeat appears to be good she continues take medications at this time    History of tongue cancer doing well status post treatment continues follow-up with her oncologist will monitor  Other significant problems:  Patient Active Problem List    Diagnosis Date Noted   • Pain of left eye 02/17/2020     Priority: Low   • Dysuria 02/17/2020     Priority: Low   • AK (actinic keratosis) 10/02/2019     Priority: Low   • Cough 05/23/2019     Priority: Low   • Allergic rhinitis 05/23/2019     Priority: Low   • Age-related osteoporosis without current pathological fracture 03/27/2019     Priority: Low   • Medicare annual wellness visit, subsequent 09/26/2018     Priority: Low   • Celiac disease 09/26/2018     Priority: Low     Saw aline 2017, low iron     • Alkaline phosphatase elevation 03/22/2018     Priority: Low   • Low vitamin B12 level 03/22/2018     Priority: Low   • Essential hypertension 03/21/2018     Priority: Low   • Subdural hematoma (CMS/HCC) 03/21/2018     Priority: Low   • Dysphagia 03/21/2018     Priority: Low   • Chronic interstitial cystitis 03/21/2018     Priority: Low   • Elevated fasting glucose 03/21/2018     Priority: Low   • Lytic lesion of bone on x-ray 03/21/2018     Priority: Low   • Ovarian cyst 03/21/2018     Priority: Low   • Neck  pain 03/21/2018     Priority: Low   • Tongue cancer (CMS/HCC) 03/21/2018     Priority: Low     Stage III, 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 Preoperative imaging with a suspicious lymph node on CT and negative PET.  5/16/16: Left partial glossectomy with partial primary closure, left level 1-5 neck dissection. Tumor 3.7 cm, DOI 1.4 cm,+ PNI, 1/44 LN, no ECS.   · Radiation recommended, saw Dr. Nicolas and she ultimately declined.  · CT neck 10/4/16 with MOSHE.    9/8/2017: T1N0M0 Left left FOM/buccal mucosa SCCa diagnosed via bx on surveillance examination.CT without any adenopathy  · WLE 9/25/2017. Pathology with negative margins -- tumor 3mm from closest margin. No LVI, No PNI              • Other seborrheic keratosis 03/21/2018     Priority: Low   • Fatigue 03/21/2018     Priority: Low   • Dizzy 03/21/2018     Priority: Low   • Anemia 03/21/2018     Priority: Low       PAST MEDICAL, FAMILY AND SOCIAL HISTORY     Medications:  Current Outpatient Medications   Medication   • cyanocobalamin 1000 MCG/ML injection   • cetirizine (ZYRTEC ALLERGY) 10 MG tablet     No current facility-administered medications for this visit.        Allergies:  ALLERGIES:   Allergen Reactions   • Adhesive   (Environmental) RASH     Severe sensitivity due to skin frailty.         Past Medical  History/Surgeries:  Past Medical History:   Diagnosis Date   • SCC (squamous cell carcinoma) 05/16/2016    left tongue; 09/08/2017 - left buccal mucosa       Past Surgical History:   Procedure Laterality Date   • Colonoscopy  08/15/2016   • Esophagogastroduodenoscopy  08/15/2016   • Hysterectomy     • Partial glossectomy Left 05/16/2016    SCC   • Salpingectomy Right 2015       Family History:  Family History   Problem Relation Age of Onset   • Heart Father        Social History:  Social History     Tobacco Use   • Smoking status: Former Smoker     Packs/day: 0.00   • Smokeless tobacco:  Never Used   Substance Use Topics   • Alcohol use: Yes       REVIEW OF SYSTEMS     Review of Systems   Constitutional: Negative for chills, fever and unexpected weight change.   Respiratory: Positive for cough. Negative for shortness of breath.    Cardiovascular: Negative for chest pain.   Gastrointestinal: Negative for abdominal pain.       PHYSICAL EXAM     Physical Exam  Constitutional:       Appearance: She is well-developed.   Cardiovascular:      Rate and Rhythm: Normal rate and regular rhythm.      Heart sounds: Normal heart sounds. No murmur. No friction rub. No gallop.    Pulmonary:      Effort: Pulmonary effort is normal. No respiratory distress.      Breath sounds: Normal breath sounds. No wheezing or rales.   Chest:      Chest wall: No tenderness.   Abdominal:      General: Bowel sounds are normal. There is no distension.      Palpations: Abdomen is soft.      Tenderness: There is no abdominal tenderness. There is no guarding or rebound.         ASSESSMENT/PLAN     Please note that all pertinent pmh/psh/allergies/meds/and family history were reviewed as pertinent to this visit.  Also, reviewed patient's last labs and prior notes.    Health maintenance was also updated and reviewed, with the following items listed as DUE and offered to the patient:    Health Maintenance Due   Topic Date Due   • Shingles Vaccine (1 of 2) 06/21/1984   • Pneumococcal Vaccine 65+ (2 of 2 - PPSV23) 06/07/2018   • Medicare Wellness 65+  10/02/2020       Information for our patients: Pertinent diagnosis that effect your health, are being monitored, and/or were reviewed or addressed today are listed below. (These are not necessarily listed in order of importance) Any active issues are usually listed with updates/monitoring. Often chronic issues that didn't need to be fully discussed may also listed to assist in appropriate monitoring and follow up. Dr. Alva carefully follow all your medical conditions and reviews notes and  communications from your specialists!    1. Elevated fasting glucose  History of elevated glucoses looks good  - CBC WITH DIFFERENTIAL; Future  - COMPREHENSIVE METABOLIC PANEL; Future  - LIPID PANEL WITH REFLEX; Future  - VITAMIN B12; Future    2. Essential hypertension  Blood pressure looks good continue to monitor  - CBC WITH DIFFERENTIAL; Future  - COMPREHENSIVE METABOLIC PANEL; Future  - LIPID PANEL WITH REFLEX; Future  - VITAMIN B12; Future    3. Low vitamin B12 level  History of low B12 looks good at this time  - CBC WITH DIFFERENTIAL; Future  - COMPREHENSIVE METABOLIC PANEL; Future  - LIPID PANEL WITH REFLEX; Future  - VITAMIN B12; Future    4. Tongue cancer (CMS/HCC)  Doing well continue to monitor with her cancer doctor.  She is overdue for follow-up with the ENT doctor who was apparently cancel due to COVID encouraged follow-up    5. Celiac disease  No issues hemoglobin looks good    6. Cough  Persistent cough offered x-ray she declines states she believe she recently had 1.  Continue Zyrtec because it works if cough persist let us know      Follow-up in 6 months for wellness visit

## 2024-03-08 ENCOUNTER — HOSPITAL ENCOUNTER (OUTPATIENT)
Dept: MRI IMAGING | Facility: HOSPITAL | Age: 49
Discharge: HOME | End: 2024-03-08
Admitting: NURSE PRACTITIONER
Payer: COMMERCIAL

## 2024-03-08 DIAGNOSIS — M48.062 LUMBAR STENOSIS WITH NEUROGENIC CLAUDICATION: ICD-10-CM

## 2024-03-08 PROCEDURE — 72148 MRI LUMBAR SPINE W/O DYE: CPT

## 2024-03-13 DIAGNOSIS — M48.062 LUMBAR STENOSIS WITH NEUROGENIC CLAUDICATION: Primary | ICD-10-CM

## 2024-03-13 RX ORDER — NAPROXEN 500 MG/1
500 TABLET ORAL 2 TIMES DAILY PRN
Qty: 60 TABLET | Refills: 0 | Status: SHIPPED | OUTPATIENT
Start: 2024-03-13

## 2024-04-03 ENCOUNTER — TELEPHONE (OUTPATIENT)
Dept: ENDOCRINOLOGY | Facility: CLINIC | Age: 49
End: 2024-04-03
Payer: COMMERCIAL

## 2024-04-03 DIAGNOSIS — Z79.4 TYPE 2 DIABETES MELLITUS WITH HYPERGLYCEMIA, WITH LONG-TERM CURRENT USE OF INSULIN: ICD-10-CM

## 2024-04-03 DIAGNOSIS — E11.65 TYPE 2 DIABETES MELLITUS WITH HYPERGLYCEMIA, WITH LONG-TERM CURRENT USE OF INSULIN: ICD-10-CM

## 2024-04-03 NOTE — TELEPHONE ENCOUNTER
----- Message from Sammie Lim sent at 4/3/2024 10:26 AM EDT -----  Regarding: Refill  Contact: [phone]  Can you please refill my Metformin. I've been getting 90 days at a time and would like to change my pharmacy to the [facility] on [address] thanks so much!   
Rx Refill Note  Requested Prescriptions      No prescriptions requested or ordered in this encounter        Last office visit with prescribing clinician: 2/5/2024      Next office visit with prescribing clinician: 7/29/2024       Lora Joshi (Jodi)  04/03/24, 10:45 EDT   
No

## 2024-04-19 ENCOUNTER — OFFICE VISIT (OUTPATIENT)
Dept: NEUROSURGERY | Facility: CLINIC | Age: 49
End: 2024-04-19
Payer: COMMERCIAL

## 2024-04-19 VITALS — WEIGHT: 144.2 LBS | BODY MASS INDEX: 24.62 KG/M2 | HEIGHT: 64 IN | TEMPERATURE: 98.4 F

## 2024-04-19 DIAGNOSIS — M54.9 MECHANICAL BACK PAIN: ICD-10-CM

## 2024-04-19 DIAGNOSIS — M48.061 SPINAL STENOSIS OF LUMBAR REGION WITHOUT NEUROGENIC CLAUDICATION: Primary | ICD-10-CM

## 2024-04-19 DIAGNOSIS — M51.36 DDD (DEGENERATIVE DISC DISEASE), LUMBAR: ICD-10-CM

## 2024-04-19 PROCEDURE — 99214 OFFICE O/P EST MOD 30 MIN: CPT | Performed by: NEUROLOGICAL SURGERY

## 2024-04-19 RX ORDER — IBUPROFEN 800 MG/1
TABLET ORAL
COMMUNITY

## 2024-04-19 RX ORDER — LAMOTRIGINE 100 MG/1
TABLET ORAL
COMMUNITY
Start: 2024-04-17

## 2024-04-19 NOTE — PROGRESS NOTES
Patient: Samime Lim  : 1975    Primary Care Provider: Geetha Norton    Requesting Provider: As above        History    Chief Complaint: Chronic low back pain.    History of Present Illness: Ms. Lim is a 48-year-old unemployed woman with a 5-10-year history of low back pain.  She has been seen in our clinic on a couple of occasions in recent years.  The pain is largely constant.  It extends down the back of both legs.  She has days that are better and days that are worse.  She has had several epidurals.  The first and second 1 more more helpful but the third was not.  Physical therapy made her worse.  She takes Lyrica for her fibromyalgia.  She has had weight loss surgery in [place] but despite the weight loss she feels worse.    Review of Systems   Constitutional:  Negative for activity change, appetite change, chills, diaphoresis, fatigue, fever and unexpected weight change.   HENT:  Negative for congestion, dental problem, drooling, ear discharge, ear pain, facial swelling, hearing loss, mouth sores, nosebleeds, postnasal drip, rhinorrhea, sinus pressure, sinus pain, sneezing, sore throat, tinnitus, trouble swallowing and voice change.    Eyes:  Negative for photophobia, pain, discharge, redness, itching and visual disturbance.   Respiratory:  Negative for apnea, cough, choking, chest tightness, shortness of breath, wheezing and stridor.    Cardiovascular:  Negative for chest pain, palpitations and leg swelling.   Gastrointestinal:  Negative for abdominal distention, abdominal pain, anal bleeding, blood in stool, constipation, diarrhea, nausea, rectal pain and vomiting.   Endocrine: Negative for cold intolerance, heat intolerance, polydipsia, polyphagia and polyuria.   Genitourinary:  Negative for decreased urine volume, difficulty urinating, dyspareunia, dysuria, enuresis, flank pain, frequency, genital sores, hematuria, menstrual problem, pelvic pain, urgency, vaginal bleeding,  "vaginal discharge and vaginal pain.   Musculoskeletal:  Positive for arthralgias, back pain, joint swelling, myalgias, neck pain and neck stiffness. Negative for gait problem.   Skin:  Negative for color change, pallor, rash and wound.   Allergic/Immunologic: Negative for environmental allergies, food allergies and immunocompromised state.   Neurological:  Negative for dizziness, tremors, seizures, syncope, facial asymmetry, speech difficulty, weakness, light-headedness, numbness and headaches.   Hematological:  Negative for adenopathy. Does not bruise/bleed easily.   Psychiatric/Behavioral:  Positive for dysphoric mood. Negative for agitation, behavioral problems, confusion, decreased concentration, hallucinations, self-injury, sleep disturbance and suicidal ideas. The patient is not nervous/anxious and is not hyperactive.      The patient's past medical history, past surgical history, family history, and social history have been reviewed at length in the electronic medical record.      Physical Exam:   Temp 98.4 °F (36.9 °C) (Infrared)   Ht 162.6 cm (64\")   Wt 65.4 kg (144 lb 3.2 oz)   BMI 24.75 kg/m²   CONSTITUTIONAL: Patient is well-nourished, pleasant and appears stated age.  MUSCULOSKELETAL:  Straight leg raising is negative.  Jordy's Sign is negative.  ROM in the low back is normal.  Tenderness in the back to palpation is not observed.  NEUROLOGICAL:  Orientation, memory, attention span, language function, and cognition have been examined and are intact.  Strength is intact in the lower extremities to direct testing.  Muscle tone is normal throughout.  Station and gait are normal.  Sensation is intact to light touch testing throughout.  Deep tendon reflexes are 1+ and symmetrical.  Coordination is intact.      Medical Decision Making    Data Review:   (All imaging studies were personally reviewed unless stated otherwise)  MRI of the lumbar spine dated 3/8/2024 is compared to the study from 4/25/2022.  " There is some degenerative disc disease and facet arthropathy.  There is fairly generous stenosis at L4-5.    Diagnosis:   1.  Chronic mechanical low back pain.  2.  Lumbar stenosis without overt neurogenic claudication.    Treatment Options:   In the absence of neurogenic claudication I would not recommend lumbar decompression.  I think most of her pain is mechanical in nature.  Treatment should remain symptomatic.      Scribed for Walt Garza MD by Bev Weiss CMA on 4/19/2024 15:16 EDT       I, Dr. Garza, personally performed the services described in the documentation, as scribed in my presence, and it is both accurate and complete.

## 2024-04-24 DIAGNOSIS — E11.65 TYPE 2 DIABETES MELLITUS WITH HYPERGLYCEMIA, WITH LONG-TERM CURRENT USE OF INSULIN: Primary | ICD-10-CM

## 2024-04-24 DIAGNOSIS — Z79.4 TYPE 2 DIABETES MELLITUS WITH HYPERGLYCEMIA, WITH LONG-TERM CURRENT USE OF INSULIN: Primary | ICD-10-CM

## 2024-05-04 DIAGNOSIS — Z79.4 TYPE 2 DIABETES MELLITUS WITH HYPERGLYCEMIA, WITH LONG-TERM CURRENT USE OF INSULIN: ICD-10-CM

## 2024-05-04 DIAGNOSIS — E11.65 TYPE 2 DIABETES MELLITUS WITH HYPERGLYCEMIA, WITH LONG-TERM CURRENT USE OF INSULIN: ICD-10-CM

## 2024-05-07 RX ORDER — TIRZEPATIDE 12.5 MG/.5ML
INJECTION, SOLUTION SUBCUTANEOUS
Qty: 2 ML | Refills: 1 | OUTPATIENT
Start: 2024-05-07

## 2024-05-24 DIAGNOSIS — E11.65 TYPE 2 DIABETES MELLITUS WITH HYPERGLYCEMIA, WITH LONG-TERM CURRENT USE OF INSULIN: ICD-10-CM

## 2024-05-24 DIAGNOSIS — Z79.4 TYPE 2 DIABETES MELLITUS WITH HYPERGLYCEMIA, WITH LONG-TERM CURRENT USE OF INSULIN: ICD-10-CM

## 2024-05-31 RX ORDER — BLOOD SUGAR DIAGNOSTIC
STRIP MISCELLANEOUS
Qty: 50 EACH | Refills: 5 | Status: SHIPPED | OUTPATIENT
Start: 2024-05-31

## 2024-05-31 NOTE — TELEPHONE ENCOUNTER
Rx Refill Note  Requested Prescriptions      No prescriptions requested or ordered in this encounter      Last office visit with prescribing clinician: 2/5/2024     Next office visit with prescribing clinician: 7/29/2024

## 2024-08-08 DIAGNOSIS — E03.9 ACQUIRED HYPOTHYROIDISM: ICD-10-CM

## 2024-08-08 RX ORDER — LEVOTHYROXINE SODIUM 88 UG/1
88 TABLET ORAL DAILY
Qty: 90 TABLET | Refills: 0 | Status: SHIPPED | OUTPATIENT
Start: 2024-08-08

## 2024-08-08 NOTE — TELEPHONE ENCOUNTER
Rx Refill Note  Requested Prescriptions     Pending Prescriptions Disp Refills    levothyroxine (SYNTHROID, LEVOTHROID) 88 MCG tablet [Pharmacy Med Name: LEVOTHYROXINE 88 MCG TABLET] 90 tablet 0     Sig: TAKE 1 TABLET BY MOUTH DAILY      Last office visit with prescribing clinician: 2/5/2024     Next office visit with prescribing clinician: 9/24/2024       Steven Juarez MA  08/08/24, 08:39 EDT

## 2024-09-02 DIAGNOSIS — Z79.4 TYPE 2 DIABETES MELLITUS WITH HYPERGLYCEMIA, WITH LONG-TERM CURRENT USE OF INSULIN: ICD-10-CM

## 2024-09-02 DIAGNOSIS — E11.65 TYPE 2 DIABETES MELLITUS WITH HYPERGLYCEMIA, WITH LONG-TERM CURRENT USE OF INSULIN: ICD-10-CM

## 2024-09-04 RX ORDER — EMPAGLIFLOZIN 25 MG/1
25 TABLET, FILM COATED ORAL DAILY
Qty: 90 TABLET | Refills: 1 | Status: SHIPPED | OUTPATIENT
Start: 2024-09-04

## 2024-09-24 ENCOUNTER — OFFICE VISIT (OUTPATIENT)
Dept: ENDOCRINOLOGY | Facility: CLINIC | Age: 49
End: 2024-09-24
Payer: COMMERCIAL

## 2024-09-24 VITALS
HEART RATE: 80 BPM | WEIGHT: 136 LBS | DIASTOLIC BLOOD PRESSURE: 72 MMHG | BODY MASS INDEX: 23.22 KG/M2 | HEIGHT: 64 IN | SYSTOLIC BLOOD PRESSURE: 118 MMHG | OXYGEN SATURATION: 100 %

## 2024-09-24 DIAGNOSIS — Z79.4 TYPE 2 DIABETES MELLITUS WITHOUT COMPLICATION, WITH LONG-TERM CURRENT USE OF INSULIN: Primary | ICD-10-CM

## 2024-09-24 DIAGNOSIS — E78.2 MIXED HYPERLIPIDEMIA: ICD-10-CM

## 2024-09-24 DIAGNOSIS — E11.9 TYPE 2 DIABETES MELLITUS WITHOUT COMPLICATION, WITH LONG-TERM CURRENT USE OF INSULIN: Primary | ICD-10-CM

## 2024-09-24 DIAGNOSIS — E03.9 ACQUIRED HYPOTHYROIDISM: ICD-10-CM

## 2024-09-24 LAB
ALBUMIN SERPL-MCNC: 4.4 G/DL (ref 3.5–5.2)
ALBUMIN UR-MCNC: <1.2 MG/DL
ALBUMIN/GLOB SERPL: 1.7 G/DL
ALP SERPL-CCNC: 50 U/L (ref 39–117)
ALT SERPL W P-5'-P-CCNC: 43 U/L (ref 1–33)
ANION GAP SERPL CALCULATED.3IONS-SCNC: 8 MMOL/L (ref 5–15)
AST SERPL-CCNC: 29 U/L (ref 1–32)
BILIRUB SERPL-MCNC: 0.3 MG/DL (ref 0–1.2)
BUN SERPL-MCNC: 11 MG/DL (ref 6–20)
BUN/CREAT SERPL: 21.2 (ref 7–25)
CALCIUM SPEC-SCNC: 9.6 MG/DL (ref 8.6–10.5)
CHLORIDE SERPL-SCNC: 100 MMOL/L (ref 98–107)
CHOLEST SERPL-MCNC: 148 MG/DL (ref 0–200)
CO2 SERPL-SCNC: 28 MMOL/L (ref 22–29)
CREAT SERPL-MCNC: 0.52 MG/DL (ref 0.57–1)
CREAT UR-MCNC: 27.7 MG/DL
DEPRECATED RDW RBC AUTO: 45.6 FL (ref 37–54)
EGFRCR SERPLBLD CKD-EPI 2021: 115.5 ML/MIN/1.73
ERYTHROCYTE [DISTWIDTH] IN BLOOD BY AUTOMATED COUNT: 13.5 % (ref 12.3–15.4)
EXPIRATION DATE: ABNORMAL
EXPIRATION DATE: ABNORMAL
GLOBULIN UR ELPH-MCNC: 2.6 GM/DL
GLUCOSE BLDC GLUCOMTR-MCNC: 156 MG/DL (ref 70–130)
GLUCOSE SERPL-MCNC: 140 MG/DL (ref 65–99)
HBA1C MFR BLD: 6.8 % (ref 4.5–5.7)
HCT VFR BLD AUTO: 39.2 % (ref 34–46.6)
HDLC SERPL-MCNC: 74 MG/DL (ref 40–60)
HGB BLD-MCNC: 12.9 G/DL (ref 12–15.9)
LDLC SERPL CALC-MCNC: 63 MG/DL (ref 0–100)
LDLC/HDLC SERPL: 0.87 {RATIO}
Lab: ABNORMAL
Lab: ABNORMAL
MCH RBC QN AUTO: 30 PG (ref 26.6–33)
MCHC RBC AUTO-ENTMCNC: 32.9 G/DL (ref 31.5–35.7)
MCV RBC AUTO: 91.2 FL (ref 79–97)
MICROALBUMIN/CREAT UR: NORMAL MG/G{CREAT}
PLATELET # BLD AUTO: 379 10*3/MM3 (ref 140–450)
PMV BLD AUTO: 10.7 FL (ref 6–12)
POTASSIUM SERPL-SCNC: 4.2 MMOL/L (ref 3.5–5.2)
PROT SERPL-MCNC: 7 G/DL (ref 6–8.5)
RBC # BLD AUTO: 4.3 10*6/MM3 (ref 3.77–5.28)
SODIUM SERPL-SCNC: 136 MMOL/L (ref 136–145)
TRIGL SERPL-MCNC: 48 MG/DL (ref 0–150)
TSH SERPL DL<=0.05 MIU/L-ACNC: 2.83 UIU/ML (ref 0.27–4.2)
VLDLC SERPL-MCNC: 11 MG/DL (ref 5–40)
WBC NRBC COR # BLD AUTO: 6.27 10*3/MM3 (ref 3.4–10.8)

## 2024-09-24 PROCEDURE — 99214 OFFICE O/P EST MOD 30 MIN: CPT | Performed by: INTERNAL MEDICINE

## 2024-09-24 PROCEDURE — 82570 ASSAY OF URINE CREATININE: CPT | Performed by: INTERNAL MEDICINE

## 2024-09-24 PROCEDURE — 80050 GENERAL HEALTH PANEL: CPT | Performed by: INTERNAL MEDICINE

## 2024-09-24 PROCEDURE — 82947 ASSAY GLUCOSE BLOOD QUANT: CPT | Performed by: INTERNAL MEDICINE

## 2024-09-24 PROCEDURE — 83036 HEMOGLOBIN GLYCOSYLATED A1C: CPT | Performed by: INTERNAL MEDICINE

## 2024-09-24 PROCEDURE — 80061 LIPID PANEL: CPT | Performed by: INTERNAL MEDICINE

## 2024-09-24 PROCEDURE — 82043 UR ALBUMIN QUANTITATIVE: CPT | Performed by: INTERNAL MEDICINE

## 2024-09-24 RX ORDER — LISDEXAMFETAMINE DIMESYLATE 40 MG/1
50 CAPSULE ORAL EVERY MORNING
COMMUNITY
Start: 2024-08-13

## 2024-11-09 DIAGNOSIS — E03.9 ACQUIRED HYPOTHYROIDISM: ICD-10-CM

## 2024-11-11 NOTE — TELEPHONE ENCOUNTER
Rx Refill Note  Requested Prescriptions     Pending Prescriptions Disp Refills    levothyroxine (SYNTHROID, LEVOTHROID) 88 MCG tablet [Pharmacy Med Name: LEVOTHYROXINE 88 MCG TABLET] 90 tablet 0     Sig: TAKE 1 TABLET BY MOUTH DAILY      Last office visit with prescribing clinician: 9/24/2024   Last telemedicine visit with prescribing clinician: Visit date not found   Next office visit with prescribing clinician: 1/28/2025                         Would you like a call back once the refill request has been completed: [] Yes [] No    If the office needs to give you a call back, can they leave a voicemail: [] Yes [] No    Abby Cohen MA  11/11/24, 14:34 EST

## 2024-11-12 RX ORDER — LEVOTHYROXINE SODIUM 88 UG/1
88 TABLET ORAL DAILY
Qty: 90 TABLET | Refills: 1 | Status: SHIPPED | OUTPATIENT
Start: 2024-11-12

## 2025-01-03 ENCOUNTER — TRANSCRIBE ORDERS (OUTPATIENT)
Dept: ADMINISTRATIVE | Facility: HOSPITAL | Age: 50
End: 2025-01-03
Payer: COMMERCIAL

## 2025-01-03 DIAGNOSIS — Z12.31 SCREENING MAMMOGRAM FOR BREAST CANCER: Primary | ICD-10-CM

## 2025-01-25 LAB
NCCN CRITERIA FLAG: NORMAL
TYRER CUZICK SCORE: 12.1

## 2025-01-28 ENCOUNTER — OFFICE VISIT (OUTPATIENT)
Dept: ENDOCRINOLOGY | Facility: CLINIC | Age: 50
End: 2025-01-28
Payer: COMMERCIAL

## 2025-01-28 ENCOUNTER — TELEPHONE (OUTPATIENT)
Dept: ENDOCRINOLOGY | Facility: CLINIC | Age: 50
End: 2025-01-28

## 2025-01-28 VITALS
HEIGHT: 64 IN | HEART RATE: 86 BPM | OXYGEN SATURATION: 100 % | DIASTOLIC BLOOD PRESSURE: 78 MMHG | BODY MASS INDEX: 23.56 KG/M2 | SYSTOLIC BLOOD PRESSURE: 122 MMHG | WEIGHT: 138 LBS

## 2025-01-28 DIAGNOSIS — Z79.4 TYPE 2 DIABETES MELLITUS WITH HYPERGLYCEMIA, WITH LONG-TERM CURRENT USE OF INSULIN: Primary | ICD-10-CM

## 2025-01-28 DIAGNOSIS — R33.9 INCOMPLETE BLADDER EMPTYING: ICD-10-CM

## 2025-01-28 DIAGNOSIS — E11.65 TYPE 2 DIABETES MELLITUS WITH HYPERGLYCEMIA, WITH LONG-TERM CURRENT USE OF INSULIN: Primary | ICD-10-CM

## 2025-01-28 DIAGNOSIS — E03.9 ACQUIRED HYPOTHYROIDISM: ICD-10-CM

## 2025-01-28 LAB
BILIRUB UR QL STRIP: NEGATIVE
CLARITY UR: CLEAR
COLOR UR: YELLOW
EXPIRATION DATE: ABNORMAL
EXPIRATION DATE: ABNORMAL
GLUCOSE BLDC GLUCOMTR-MCNC: 145 MG/DL (ref 70–130)
GLUCOSE UR STRIP-MCNC: ABNORMAL MG/DL
HBA1C MFR BLD: 7.4 % (ref 4.5–5.7)
HGB UR QL STRIP.AUTO: NEGATIVE
HOLD SPECIMEN: NORMAL
KETONES UR QL STRIP: NEGATIVE
LEUKOCYTE ESTERASE UR QL STRIP.AUTO: NEGATIVE
Lab: ABNORMAL
Lab: ABNORMAL
NITRITE UR QL STRIP: NEGATIVE
PH UR STRIP.AUTO: 6 [PH] (ref 5–8)
PROT UR QL STRIP: NEGATIVE
SP GR UR STRIP: >1.03 (ref 1–1.03)
UROBILINOGEN UR QL STRIP: ABNORMAL

## 2025-01-28 PROCEDURE — 82947 ASSAY GLUCOSE BLOOD QUANT: CPT | Performed by: INTERNAL MEDICINE

## 2025-01-28 PROCEDURE — 81003 URINALYSIS AUTO W/O SCOPE: CPT | Performed by: INTERNAL MEDICINE

## 2025-01-28 PROCEDURE — 99214 OFFICE O/P EST MOD 30 MIN: CPT | Performed by: INTERNAL MEDICINE

## 2025-01-28 RX ORDER — DEXTROAMPHETAMINE SACCHARATE, AMPHETAMINE ASPARTATE, DEXTROAMPHETAMINE SULFATE AND AMPHETAMINE SULFATE 5; 5; 5; 5 MG/1; MG/1; MG/1; MG/1
1 TABLET ORAL EVERY MORNING
COMMUNITY
Start: 2025-01-21

## 2025-01-28 RX ORDER — CLONIDINE HYDROCHLORIDE 0.1 MG/1
0.1 TABLET, EXTENDED RELEASE ORAL 2 TIMES DAILY
COMMUNITY
Start: 2025-01-15

## 2025-01-28 RX ORDER — DEXTROAMPHETAMINE SACCHARATE, AMPHETAMINE ASPARTATE, DEXTROAMPHETAMINE SULFATE AND AMPHETAMINE SULFATE 2.5; 2.5; 2.5; 2.5 MG/1; MG/1; MG/1; MG/1
10 TABLET ORAL DAILY
COMMUNITY
Start: 2025-01-21

## 2025-01-28 NOTE — TELEPHONE ENCOUNTER
----- Message from Bouchra Chao sent at 1/28/2025  9:01 AM EST -----  Regarding: ophtho exam  Please request ophtho exam report from [facility].

## 2025-01-28 NOTE — TELEPHONE ENCOUNTER
----- Message from Bouchra Chao sent at 1/28/2025  9:07 AM EST -----  Regarding: labs  Please request labs updated with PCP within the last 1-2 months. TY!

## 2025-01-28 NOTE — PROGRESS NOTES
Chief Complaint   Patient presents with    Diabetes     Type II.           HPI   Sammie Lim is a 49 y.o. female had concerns including Diabetes (Type II. ).    She is checking blood sugars 0 times per day.  Current medications for diabetes include metformin 1000 mg twice daily, Jardiance 25 mg daily, Mounjaro 15 mg weekly.    A1c higher she thinks due to holidays/snacking on candies.     Has a sensation that she isn't emptying her bladder completely and has back pain. Concern for UTI.  Started 2 weeks ago.  No vaginal discharge or irritation, more suggestive of yeast.    PCP did many labs recently. Suspect pre/perimenopausal. Notes headaches and cold intolerance with increase in fatigue.     Sleep has been good. Gets 7 hrs at night.     The following portions of the patient's history were reviewed and updated as appropriate: allergies, current medications, past family history, past medical history, past social history, past surgical history, and problem list.      Review of Systems   Constitutional: Negative.    Endocrine: Positive for cold intolerance.   Genitourinary:  Positive for urgency.   Musculoskeletal:  Positive for back pain.   Neurological:  Positive for headache.        Physical Exam  Vitals reviewed.   Constitutional:       Appearance: Normal appearance.   Cardiovascular:      Rate and Rhythm: Normal rate.      Pulses:           Dorsalis pedis pulses are 2+ on the right side and 2+ on the left side.   Pulmonary:      Effort: Pulmonary effort is normal.   Feet:      Right foot:      Skin integrity: Skin integrity normal.      Toenail Condition: Right toenails are normal.      Left foot:      Skin integrity: Skin integrity normal.      Toenail Condition: Left toenails are normal.      Comments: Diabetic Foot Exam Performed and Monofilament Test Performed    Monofilament 5/5 bilaterally    Neurological:      General: No focal deficit present.      Mental Status: She is alert. Mental status is  "at baseline.   Psychiatric:         Mood and Affect: Mood normal.         Behavior: Behavior normal.        /78 (BP Location: Left arm, Patient Position: Sitting, Cuff Size: Adult)   Pulse 86   Ht 162.6 cm (64.02\")   Wt 62.6 kg (138 lb)   SpO2 100%   BMI 23.68 kg/m²      Labs and imaging    A1C:  Lab Results   Component Value Date    HGBA1C 7.4 (A) 01/28/2025    HGBA1C 6.8 (A) 09/24/2024      Glucose:  Lab Results   Component Value Date    POCGLU 145 (A) 01/28/2025      CMP:  Lab Results   Component Value Date    GLUCOSE 140 (H) 09/24/2024    BUN 11 09/24/2024    CREATININE 0.52 (L) 09/24/2024     09/24/2024    K 4.2 09/24/2024     09/24/2024    CALCIUM 9.6 09/24/2024    PROTEINTOT 7.0 09/24/2024    ALBUMIN 4.4 09/24/2024    ALT 43 (H) 09/24/2024    AST 29 09/24/2024    ALKPHOS 50 09/24/2024    BILITOT 0.3 09/24/2024    GLOB 2.6 09/24/2024    AGRATIO 1.7 09/24/2024    BCR 21.2 09/24/2024    ANIONGAP 8.0 09/24/2024    EGFR 115.5 09/24/2024      Lipid Panel:  Lab Results   Component Value Date    CHOL 148 09/24/2024    TRIG 48 09/24/2024    HDL 74 (H) 09/24/2024    LDL 63 09/24/2024      Urine Microalbumin:  Lab Results   Component Value Date    MALBCRERATIO  09/24/2024      Comment:      Unable to calculate      TSH:  Lab Results   Component Value Date    TSH 2.830 09/24/2024    TSH 3.480 07/10/2023    TSH 0.026 (L) 03/06/2023        Assessment and plan  Diagnoses and all orders for this visit:    1. Type 2 diabetes mellitus with hyperglycemia, with long-term current use of insulin (Primary)  Uncontrolled more hyperglycemia.  A1c has trended up to 7.4.  No complications from diabetes.  Due to dietary indiscretion over the holidays.    Start monitoring BG's more consistently.  Call the office if persistently elevated.  Continue metformin 1000 mg twice daily, Jardiance 25 mg daily, Mounjaro 15 mg weekly.  Labs are up-to-date from September 2024 and PCP updated recently.  Ophtho exam is up-to-date " from last fall and will be requested.  Monofilament was updated today.  -     POC Glycosylated Hemoglobin (Hb A1C)  -     POC Glucose, Blood    2. Incomplete bladder emptying  Screen for UTI.  If negative, she has fluconazole script and can treat for vaginal yeast infection.  -     Urinalysis With Culture If Indicated - Urine, Clean Catch    3. Acquired hypothyroidism  With cold intolerance and fatigue.  PCP recently checked labs.  She is on levothyroxine 88 mcg daily.  Request recent labs from PCP and titrate dose if needed.       Return in about 4 months (around 5/28/2025) for next scheduled follow up. The patient was instructed to contact the clinic with any interval questions or concerns.    Electronically signed by: Bouchra Chao DO   Endocrinologist    Please note that portions of this note were completed with a voice recognition program.

## 2025-01-29 DIAGNOSIS — E03.9 ACQUIRED HYPOTHYROIDISM: ICD-10-CM

## 2025-01-29 RX ORDER — LEVOTHYROXINE SODIUM 100 UG/1
100 TABLET ORAL DAILY
Qty: 30 TABLET | Refills: 3 | Status: SHIPPED | OUTPATIENT
Start: 2025-01-29

## 2025-02-25 DIAGNOSIS — E03.9 ACQUIRED HYPOTHYROIDISM: ICD-10-CM

## 2025-02-25 NOTE — TELEPHONE ENCOUNTER
I would like a 90 day fill.     Rx Refill Note  Requested Prescriptions      No prescriptions requested or ordered in this encounter        Last office visit with prescribing clinician: 1/28/2025      Next office visit with prescribing clinician: 6/2/2025       Lora Joshi (Jodi)  02/25/25, 14:50 EST

## 2025-02-26 RX ORDER — LEVOTHYROXINE SODIUM 100 UG/1
100 TABLET ORAL DAILY
Qty: 90 TABLET | Refills: 1 | Status: SHIPPED | OUTPATIENT
Start: 2025-02-26

## 2025-03-10 DIAGNOSIS — Z79.4 TYPE 2 DIABETES MELLITUS WITH HYPERGLYCEMIA, WITH LONG-TERM CURRENT USE OF INSULIN: ICD-10-CM

## 2025-03-10 DIAGNOSIS — E11.65 TYPE 2 DIABETES MELLITUS WITH HYPERGLYCEMIA, WITH LONG-TERM CURRENT USE OF INSULIN: ICD-10-CM

## 2025-03-10 RX ORDER — EMPAGLIFLOZIN 25 MG/1
25 TABLET, FILM COATED ORAL DAILY
Qty: 90 TABLET | Refills: 1 | Status: SHIPPED | OUTPATIENT
Start: 2025-03-10

## 2025-03-10 RX ORDER — EMPAGLIFLOZIN 25 MG/1
25 TABLET, FILM COATED ORAL DAILY
Qty: 90 TABLET | Refills: 1 | Status: CANCELLED | OUTPATIENT
Start: 2025-03-10

## 2025-03-10 RX ORDER — EMPAGLIFLOZIN 25 MG/1
25 TABLET, FILM COATED ORAL DAILY
Qty: 90 TABLET | Refills: 1 | Status: SHIPPED | OUTPATIENT
Start: 2025-03-10 | End: 2025-03-10 | Stop reason: SDUPTHER

## 2025-03-10 NOTE — TELEPHONE ENCOUNTER
Rx Refill Note  Requested Prescriptions      No prescriptions requested or ordered in this encounter          Last office visit with prescribing clinician: 1/28/2025     Next office visit with prescribing clinician: 6/2/2025         Cheyanne Lopez MA  03/10/25, 13:58 EDT

## 2025-03-17 NOTE — PROGRESS NOTES
"Chief Complaint: \" Lower back and leg pain.\"        History of Present Illness:   Patient: Ms. Sammie Lim, 49 y.o. female originally referred by Valarie Kebede PA-C in consultation for chronic intractable lower back pain.  Patient reports a longstanding history of chronic lower back pain.  I saw her last in January 2024.  On February 19, 2024, she underwent a bilateral L4-L5 transforaminal epidural steroid injection, she messaged me about 2 weeks thereafter, reporting she did not experience relief of her symptoms.  I recommended a new MRI of the lumbar spine.  MRI was performed on 3/8/2024, which demonstrates diffuse facet arthritis, at L4-L5 there is moderate degenerative disc disease with a broad-based central disc protrusion creating moderate central spinal stenosis with encroachment upon the bilateral L5 nerve roots, ligamentum flavum hypertrophy and facet hypertrophy.  At L5-S1 there is disc degeneration with a broad-based central disc protrusion greater on the right, creating moderate central spinal stenosis with moderate bilateral neural foraminal stenosis, and facet hypertrophy.  I referred her for neurosurgical consultation due to failure to obtain relief with pain management measures.  She saw Dr. Garza on 4/19/2024, and with the absence of neurogenic claudication, he did not recommend surgical intervention.  She tells me she has done quite well over the last year.  Today she is reporting pain radiating into her hips, lateral thighs and into her calves and feet, as prior.  She is here today for reevaluation of her recurrent chronic pain.  Pain Description: constant pain with intermittent exacerbation, described as aching, shocking, dull, sharp, and shooting sensation.   Radiation of Pain: The radiates into the bilateral gluteal region, bilateral hips, and lateral thighs, into her calves and feet  Pain intensity today: 2/10  Average pain intensity last week: 3/10  Pain intensity ranges from: " 0/10 to 8/10  Aggravating factors: Pain increases with protracted sitting, bending, twisting, standing, walking.     Alleviating factors: Pain decreases with heat, lying down   Associated Symptoms:   Patient reports pain and weakness in the lower extremities.   Patient denies any new bladder or bowel problems.   Patient denies difficulties with her balance or recent falls.   Pain longer interferes with ADLs, general activities (ability to walk, stand, transition from different positions)  Pain interferes with sleep causing sleep fragmentation       Review of previous therapies and additional medical records:  Sammie Lim has already failed the following measures, including:   Conservative Measures: Oral analgesics, marijuana, topical analgesics, heat, physical therapy, physical therapist directed home exercise program (ongoing), water therapy in the past  Interventional Measures: 12/12/2022: DxTx bilateral L4-L5 transforaminal epidural steroid injections  03/15/2023: DxTx bilateral L5-S1 transforaminal epidural steroid injections  10/30/2023: bilateral L5-S1 transforaminal epidural steroid injections  02/19/2024: DxTx bilateral L4-L5 transforaminal epidural steroid injections  Surgical Measures: No history of previous cervical spine, lumbar spine or hip surgery  Sammie Lim underwent neurosurgical consultation with Dr. Walt Garza on 04/19/2024, and was found not to be a surgical candidate.   Sammie Lim presents with significant comorbidities including Diabetes, hypothyroidism, depression  In terms of current analgesics, Sammie Lim takes: ibuprofen, Voltaren gel, Lyrica, tizanidine. Patient also takes Pristiq   I have reviewed Samuel Report consistent with medication reconciliation.  SOAPP: Not completed by the patient        Global Pain Scale 11-10  2022 02-27 2023 06-19 20232 01-29 2024 03-18 2025      Pain 12 10 2 15 8      Feelings 18 12 3 24 8      Clinical  outcomes 10 12 1 17 6      Activities 20 12 0 12 2      GPS Total: 60 56 6 68 24        The [place] Back Pain Disability Scale  DATE 11-10  2022 02-27  2023 06-19  2023 01-29  2024 03-18  2025      Sleep through the night 5 5 1 2 0      Turn over in bed 0 1 0 0 0      Get out of bed 3 1 0 0 0      Make your bed 5 5 0 0 0      Put on socks (pantyhose) 5 2 1 0 0      Ride in a car 2 1 1 4 2      Sit in a chair for several hours 4 2 1 4 4      Stand up for 20-30 minutes 1 0 0 4 2      Climb one flight of stairs 1 0 0 4 0      Walk a few blocks (200-300 yards)  2 0 0 0 0      Walk several miles 5 1 0 2 5      Run one block (about 50 yards) 5 2 5 0 5      Take food out of the refrigerator 2 1 0 2 0      Reach up to high shelves 5 5 2 4 0      Move a chair 1 4 0 4 0      Pull or push heavy doors 1 2 1 5 2      Bend over to clean the bathtub 5 5 5 0 5      Throw a ball 5 5 1 0 0      Carry two bags of groceries 1 1 0 0 0      Lift and carry a heavy suitcase 5 5 5 5 5      Total score 63 48 23 40 33        Review of New Diagnostic Studies:  MRI of the lumbar spine without contrast on 3/8/2024, which demonstrates diffuse facet arthritis, at L4-L5 there is moderate degenerative disc disease with a broad-based central disc protrusion creating moderate central spinal stenosis with encroachment upon the bilateral L5 nerve roots, ligamentum flavum hypertrophy and facet hypertrophy.  At L5-S1 there is disc degeneration with a broad-based central disc protrusion greater on the right, creating moderate central spinal stenosis with moderate bilateral neural foraminal stenosis, and facet hypertrophy.    Review of Diagnostic Studies:  Lumbar spine x-rays and flexion-extension views 11/21/2022: No evidence of listhesis or instability.  MRI of the lumbar spine without contrast 4/25/2022:   L1-L2, L2-L3, L3-L4: No significant canal or foraminal stenosis  L4-L5: Posterior disc bulge, facet hypertrophy, ligamentum flavum hypertrophy  contributing to severe canal stenosis with clumping of the nerve roots.  Moderate bilateral foraminal stenosis  L5-S1: Mild disc osteophyte bulging disc. Mild canal stenosis.  Severe bilateral neuroforaminal stenosis with impingement of the exiting L5 nerve roots bilaterally    Review of Systems   Constitutional:  Positive for diaphoresis and fatigue.   Musculoskeletal:  Positive for back pain.   Neurological:  Positive for headaches.   Psychiatric/Behavioral:  The patient is nervous/anxious.    All other systems reviewed and are negative.        Patient Active Problem List   Diagnosis    Lumbar stenosis with neurogenic claudication    Adhesive arachnoiditis    Ligamentum flavum hypertrophy    Degeneration of lumbar or lumbosacral intervertebral disc    Spondylosis of lumbar region without myelopathy or radiculopathy    Anxiety and depression    Moderate obesity    Arthritis       Past Medical History:   Diagnosis Date    Anemia     Arthritis     Back problem     Bladder infection     Cervical disc disorder     Chronic pain disorder     Claustrophobia     Depression     Diabetes     Endometriosis     Fibromyalgia, primary     Gestational diabetes     Hypothyroidism     Joint pain     Low back pain     Lumbosacral disc disease     Osteoarthritis     Rheumatoid arthritis     Spinal stenosis     Thyroid disease     Type 2 diabetes mellitus          Past Surgical History:   Procedure Laterality Date     SECTION      X2 ,     CHOLECYSTECTOMY  2019    EPIDURAL BLOCK      HYSTERECTOMY  2019    OOPHORECTOMY  2019    UNILATERAL         Family History   Problem Relation Age of Onset    Cancer Mother     Obesity Mother     Arthritis Mother     Stroke Mother     Thyroid disease Mother     Alcohol abuse Father     Kidney disease Father     Cirrhosis Father     Cancer Father     Diabetes Maternal Aunt     Breast cancer Maternal Aunt         DX AGE  UNKNOWN    Cancer Brother     Depression Brother     Drug abuse Brother     Depression Sister     Obesity Sister     Migraines Sister     Ovarian cancer Neg Hx          Social History     Socioeconomic History    Marital status:    Tobacco Use    Smoking status: Never   Vaping Use    Vaping status: Never Used   Substance and Sexual Activity    Alcohol use: Never    Drug use: Not Currently     Frequency: 3.0 times per week     Types: Marijuana    Sexual activity: Not Currently     Partners: Male     Birth control/protection: Hysterectomy           Current Outpatient Medications:     amphetamine-dextroamphetamine (ADDERALL) 10 MG tablet, Take 1 tablet by mouth Daily., Disp: , Rfl:     amphetamine-dextroamphetamine (ADDERALL) 20 MG tablet, Take 1 tablet by mouth Every Morning., Disp: , Rfl:     atorvastatin (LIPITOR) 20 MG tablet, , Disp: , Rfl:     cloNIDine HCl ER 0.1 MG tablet sustained-release 12 hour tablet, Take 1 tablet by mouth 2 (Two) Times a Day., Disp: , Rfl:     desvenlafaxine (PRISTIQ) 100 MG 24 hr tablet, Take 1 tablet by mouth Daily., Disp: , Rfl:     eszopiclone (LUNESTA) 3 MG tablet, eszopiclone 3 mg tablet, Disp: , Rfl:     glucose blood (OneTouch Verio) test strip, Check glucose twice daily, Disp: 50 each, Rfl: 5    glucose monitor monitoring kit, Use 1 each 2 (Two) Times a Day., Disp: 1 each, Rfl: 0    Jardiance 25 MG tablet tablet, Take 1 tablet by mouth Daily., Disp: 90 tablet, Rfl: 1    lamoTRIgine (LaMICtal) 100 MG tablet, , Disp: , Rfl:     levothyroxine (SYNTHROID, LEVOTHROID) 100 MCG tablet, Take 1 tablet by mouth Daily., Disp: 90 tablet, Rfl: 1    metFORMIN (GLUCOPHAGE) 1000 MG tablet, TAKE 1 TABLET BY MOUTH TWICE A DAY WITH MEALS, Disp: 180 tablet, Rfl: 1    omeprazole (priLOSEC) 40 MG capsule, Take 1 capsule by mouth Daily., Disp: , Rfl:     pregabalin (LYRICA) 150 MG capsule, , Disp: , Rfl:     Rinvoq 15 MG tablet sustained-release 24 hour, , Disp: , Rfl:     solifenacin (VESICARE)  "10 MG tablet, , Disp: , Rfl:     Tirzepatide (Mounjaro) 15 MG/0.5ML solution pen-injector pen, Inject 0.5 mL under the skin into the appropriate area as directed 1 (One) Time Per Week., Disp: 6 mL, Rfl: 1      Allergies   Allergen Reactions    Aripiprazole Confusion    Lithium Confusion         Ht 162.6 cm (64.02\")   Wt 64.9 kg (143 lb)   BMI 24.53 kg/m²       Physical Exam:  Constitutional: Patient appears well-developed, well-nourished, well-hydrated, appears younger than stated age  HEENT: Head: Normocephalic and atraumatic  Eyes: Conjunctivae and lids are normal  Pupils: Equal, round, reactive to light  Peripheral vascular exam: Posterior tibialis: right 2+ and left 2+. Dorsalis pedis: right 2+ and left 2+. No edema.   Musculoskeletal   Gait and station: Gait evaluation demonstrated a normal gait  Lumbar spine: Passive and active range of motion are limited secondary to pain.  Extension, flexion, and rotation of the lumbar spine increased and reproduced pain. Lumbar facet joint loading maneuvers are positive.  Jordy test, Yeomans, SI compression, and Gaenslen's test are negative   Piriformis maneuvers are negative   Hip joints: The range of motion of the hip joints is almost full and without pain   Palpation of the bilateral greater trochanter, unrevealing   Examination of the Iliotibial band: unrevealing   Neurological:   Patient is alert and oriented to person, place, and time.   Speech: Normal.   Cortical function: Normal mental status.   Reflex Scores:  Right patellar: 1+  Left patellar: 1+  Right Achilles: 1+  Left Achilles: 1+  Motor strength: 5/5  Motor Tone: Normal  Involuntary movements: None.   Superficial/Primitive Reflexes: Primitive reflexes were absent.   Right Corey: Absent  Left Corey: Absent  Right ankle clonus: Absent  Left ankle clonus: Absent   Babinsky: Absent  Long tract signs: Negative. Straight leg raising test: Negative. Femoral stretch sign: Negative.   Sensory exam: Intact to " light touch, intact pain and temperature sensation, intact vibration sensation and normal proprioception.   Coordination: Normal finger to nose and heel to shin. Normal balance and negative Romberg's sign   Skin and subcutaneous tissue: Skin is warm and intact. No rash noted. No cyanosis.   Psychiatric: Judgment and insight: Normal. Recent and remote memory: Intact. Mood and affect: Normal.     ASSESSMENT:   1. Osseous stenosis of neural canal of lumbar region    2. Spondylosis of lumbar region without myelopathy or radiculopathy    3. Degeneration of intervertebral disc of lumbosacral region with discogenic back pain and lower extremity pain    4. Anxiety and depression              PLAN/MEDICAL DECISION MAKING:  Ms. Sammie Lim, 49 y.o. reports a longstanding history of chronic lower back pain.  I saw her last in January 2024.  On February 19, 2024, she underwent a bilateral L4-L5 transforaminal epidural steroid injection, she messaged me about 2 weeks thereafter, reporting she did not experience relief of her symptoms.  I recommended a new MRI of the lumbar spine.  MRI was performed on 3/8/2024, which demonstrates diffuse facet arthritis, at L4-L5 there is moderate degenerative disc disease with a broad-based central disc protrusion creating moderate central spinal stenosis with encroachment upon the bilateral L5 nerve roots, ligamentum flavum hypertrophy and facet hypertrophy.  At L5-S1 there is disc degeneration with a broad-based central disc protrusion greater on the right, creating moderate central spinal stenosis with moderate bilateral neural foraminal stenosis, and facet hypertrophy.  I referred her for neurosurgical consultation due to failure to obtain relief with pain management measures.  She saw Dr. Garza on 4/19/2024, and with the absence of neurogenic claudication, he did not recommend surgical intervention.  Today she is reporting pain radiating into her hips, lateral thighs and into  her calves and feet.  She does not feel that her symptoms today warrant an injection.  She will keep you posted on her ongoing progress. Patient has failed to obtain pain relief with conservative measures including oral analgesics (ibuprofen, Lyrica, etc), marijuana (some relief), topical analgesics, heat, physical therapy, physical therapist directed home exercise program (ongoing), water therapy in the past, to name a few.  I have reviewed all available patient's medical records as well as previous therapies as referenced above. I had a lengthy conversation with Ms. Sammie Ford Lim regarding her chronic pain condition and potential therapeutic options including risks, benefits, alternative therapies, to name a few. Therefore, I have proposed the following plan:  1. Interventional pain management measures: None indicated at this time.  If her symptoms increase she will contact me, and we may proceed with patient will be scheduled for bilateral L5-S1 transforaminal epidural steroid injections.  We may repeat epidural depending on patient's outcome and distribution of pain.  Patient has recently been found not to be a surgical candidate, therefore she could be a potential candidate for spinal cord stimulator trial if failure to achieve sustained relief from minimally invasive procedures.  She also does have a mechanical component to her pain, which we could consider lumbar MBB's.  2. Diagnostic studies: None indicated this time.  If her lower extremity symptoms increase we may consider  A. EMG/NCV of the bilateral lower extremities depending on continued response  3. Pharmacological measures: Reviewed and discussed;   A. Patient takes ibuprofen, Voltaren gel, Lyrica, tizanidine. Patient also takes Pristiq, Lunesta, Lamictal  4. Long-term rehabilitation efforts:  A. The patient does not have a history of falls. I did complete a risk assessment for falls.   B. Patient will start a comprehensive physical therapy  program for Alter-G, core strengthening, gait and balance training, neurodynamics, E-STIM, myofascial release, cupping, dry needling, home exercise program if pain recurs  C. Start an exercise program such as water therapy and swimming.  Continue daily walks for fitness  D. Contrast therapy: Apply ice-packs for 15-20 minutes, followed by heating pads for 15-20 minutes to affected area   5. The patient has been instructed to contact my office with any questions or difficulties. The patient understands the plan and agrees to proceed accordingly.        Pain Medications              desvenlafaxine (PRISTIQ) 100 MG 24 hr tablet Take 1 tablet by mouth Daily.    lamoTRIgine (LaMICtal) 100 MG tablet     pregabalin (LYRICA) 150 MG capsule     Rinvoq 15 MG tablet sustained-release 24 hour            Please note that portions of this note were completed with a voice recognition program.     TESSY Gatica    Patient Care Team:  Mikal Hatfield APRN as PCP - General (Internal Medicine)  Reese Milton MD as Consulting Physician (Pain Medicine)  Bouchra Chao DO as Consulting Physician (Endocrinology)  Alise Lynn MD as Consulting Physician (Rheumatology)  Anabel Avina APRN as Nurse Practitioner (Pain Medicine)  Geetha Norton MD as Primary Care Provider (Family Medicine)  Anabel Avina APRN as Referring Physician (Nurse Practitioner)  Walt Garza MD as Consulting Physician (Neurosurgery)     No orders of the defined types were placed in this encounter.        Future Appointments   Date Time Provider Department Center   6/2/2025  8:15 AM Bouchra Chao DO [facility]

## 2025-03-18 ENCOUNTER — OFFICE VISIT (OUTPATIENT)
Dept: PAIN MEDICINE | Facility: CLINIC | Age: 50
End: 2025-03-18
Payer: COMMERCIAL

## 2025-03-18 VITALS — WEIGHT: 143 LBS | HEIGHT: 64 IN | BODY MASS INDEX: 24.41 KG/M2

## 2025-03-18 DIAGNOSIS — F32.A ANXIETY AND DEPRESSION: ICD-10-CM

## 2025-03-18 DIAGNOSIS — F41.9 ANXIETY AND DEPRESSION: ICD-10-CM

## 2025-03-18 DIAGNOSIS — M99.33 OSSEOUS STENOSIS OF NEURAL CANAL OF LUMBAR REGION: ICD-10-CM

## 2025-03-18 DIAGNOSIS — M47.816 SPONDYLOSIS OF LUMBAR REGION WITHOUT MYELOPATHY OR RADICULOPATHY: ICD-10-CM

## 2025-03-18 DIAGNOSIS — M51.372 DEGENERATION OF INTERVERTEBRAL DISC OF LUMBOSACRAL REGION WITH DISCOGENIC BACK PAIN AND LOWER EXTREMITY PAIN: ICD-10-CM

## 2025-03-18 PROCEDURE — 99214 OFFICE O/P EST MOD 30 MIN: CPT | Performed by: NURSE PRACTITIONER

## 2025-03-30 DIAGNOSIS — Z79.4 TYPE 2 DIABETES MELLITUS WITH HYPERGLYCEMIA, WITH LONG-TERM CURRENT USE OF INSULIN: ICD-10-CM

## 2025-03-30 DIAGNOSIS — E11.65 TYPE 2 DIABETES MELLITUS WITH HYPERGLYCEMIA, WITH LONG-TERM CURRENT USE OF INSULIN: ICD-10-CM

## 2025-03-31 NOTE — TELEPHONE ENCOUNTER
Rx Refill Note  Requested Prescriptions     Pending Prescriptions Disp Refills    metFORMIN (GLUCOPHAGE) 1000 MG tablet [Pharmacy Med Name: METFORMIN HCL 1,000 MG TABLET] 180 tablet 1     Sig: TAKE 1 TABLET BY MOUTH TWICE A DAY WITH MEALS      Last office visit with prescribing clinician: 1/28/2025     Next office visit with prescribing clinician: 6/2/2025     Laine Trimble MA  03/31/25, 09:22 EDT

## 2025-05-12 DIAGNOSIS — E11.9 TYPE 2 DIABETES MELLITUS WITHOUT COMPLICATION, WITH LONG-TERM CURRENT USE OF INSULIN: ICD-10-CM

## 2025-05-12 DIAGNOSIS — Z79.4 TYPE 2 DIABETES MELLITUS WITHOUT COMPLICATION, WITH LONG-TERM CURRENT USE OF INSULIN: ICD-10-CM

## 2025-05-12 NOTE — TELEPHONE ENCOUNTER
Caller: MICHELLE MONICA     Relationship: SELF    Best call back number: [phone]     Requested Prescriptions: Tirzepatide (Mounjaro) 15 MG/0.5ML solution pen-injector pen #[id number]  Requested Prescriptions      No prescriptions requested or ordered in this encounter        Pharmacy where request should be sent:  [facility]     Last office visit with prescribing clinician: 1/28/2025   Last telemedicine visit with prescribing clinician: Visit date not found   Next office visit with prescribing clinician: 6/2/2025     Additional details provided by patient:     Does the patient have less than a 3 day supply:  [] Yes  [x] No    Would you like a call back once the refill request has been completed: [x] Yes [] No    If the office needs to give you a call back, can they leave a voicemail: [x] Yes [] No    Wilfred Leigh Rep   05/12/25 15:01 EDT

## 2025-06-02 ENCOUNTER — OFFICE VISIT (OUTPATIENT)
Dept: ENDOCRINOLOGY | Facility: CLINIC | Age: 50
End: 2025-06-02
Payer: COMMERCIAL

## 2025-06-02 ENCOUNTER — TELEPHONE (OUTPATIENT)
Dept: ENDOCRINOLOGY | Facility: CLINIC | Age: 50
End: 2025-06-02

## 2025-06-02 VITALS
DIASTOLIC BLOOD PRESSURE: 76 MMHG | BODY MASS INDEX: 22.61 KG/M2 | HEIGHT: 64 IN | WEIGHT: 132.4 LBS | HEART RATE: 85 BPM | OXYGEN SATURATION: 99 % | SYSTOLIC BLOOD PRESSURE: 122 MMHG

## 2025-06-02 DIAGNOSIS — Z79.4 TYPE 2 DIABETES MELLITUS WITH HYPERGLYCEMIA, WITH LONG-TERM CURRENT USE OF INSULIN: Primary | ICD-10-CM

## 2025-06-02 DIAGNOSIS — E03.9 ACQUIRED HYPOTHYROIDISM: ICD-10-CM

## 2025-06-02 DIAGNOSIS — E11.65 TYPE 2 DIABETES MELLITUS WITH HYPERGLYCEMIA, WITH LONG-TERM CURRENT USE OF INSULIN: Primary | ICD-10-CM

## 2025-06-02 DIAGNOSIS — E78.2 MIXED HYPERLIPIDEMIA: ICD-10-CM

## 2025-06-02 PROBLEM — E11.9 TYPE 2 DIABETES MELLITUS WITHOUT COMPLICATION, WITH LONG-TERM CURRENT USE OF INSULIN: Status: ACTIVE | Noted: 2025-06-02

## 2025-06-02 LAB
ALBUMIN SERPL-MCNC: 4.3 G/DL (ref 3.5–5.2)
ALBUMIN UR-MCNC: <1.2 MG/DL
ALBUMIN/GLOB SERPL: 1.7 G/DL
ALP SERPL-CCNC: 57 U/L (ref 39–117)
ALT SERPL W P-5'-P-CCNC: 10 U/L (ref 1–33)
ANION GAP SERPL CALCULATED.3IONS-SCNC: 11 MMOL/L (ref 5–15)
AST SERPL-CCNC: 14 U/L (ref 1–32)
BILIRUB SERPL-MCNC: 0.4 MG/DL (ref 0–1.2)
BUN SERPL-MCNC: 10 MG/DL (ref 6–20)
BUN/CREAT SERPL: 18.9 (ref 7–25)
CALCIUM SPEC-SCNC: 9.3 MG/DL (ref 8.6–10.5)
CHLORIDE SERPL-SCNC: 99 MMOL/L (ref 98–107)
CHOLEST SERPL-MCNC: 161 MG/DL (ref 0–200)
CO2 SERPL-SCNC: 29 MMOL/L (ref 22–29)
CREAT SERPL-MCNC: 0.53 MG/DL (ref 0.57–1)
CREAT UR-MCNC: 88.7 MG/DL
EGFRCR SERPLBLD CKD-EPI 2021: 115 ML/MIN/1.73
EXPIRATION DATE: ABNORMAL
EXPIRATION DATE: ABNORMAL
GLOBULIN UR ELPH-MCNC: 2.5 GM/DL
GLUCOSE BLDC GLUCOMTR-MCNC: 133 MG/DL (ref 70–130)
GLUCOSE SERPL-MCNC: 149 MG/DL (ref 65–99)
HBA1C MFR BLD: 7.3 % (ref 4.5–5.7)
HDLC SERPL-MCNC: 80 MG/DL (ref 40–60)
LDLC SERPL CALC-MCNC: 72 MG/DL (ref 0–100)
LDLC/HDLC SERPL: 0.91 {RATIO}
Lab: ABNORMAL
Lab: ABNORMAL
MICROALBUMIN/CREAT UR: NORMAL MG/G{CREAT}
POTASSIUM SERPL-SCNC: 4 MMOL/L (ref 3.5–5.2)
PROT SERPL-MCNC: 6.8 G/DL (ref 6–8.5)
SODIUM SERPL-SCNC: 139 MMOL/L (ref 136–145)
T4 FREE SERPL-MCNC: 1.39 NG/DL (ref 0.92–1.68)
TRIGL SERPL-MCNC: 40 MG/DL (ref 0–150)
TSH SERPL DL<=0.05 MIU/L-ACNC: 2.03 UIU/ML (ref 0.27–4.2)
VLDLC SERPL-MCNC: 9 MG/DL (ref 5–40)

## 2025-06-02 PROCEDURE — 82570 ASSAY OF URINE CREATININE: CPT | Performed by: INTERNAL MEDICINE

## 2025-06-02 PROCEDURE — 82043 UR ALBUMIN QUANTITATIVE: CPT | Performed by: INTERNAL MEDICINE

## 2025-06-02 PROCEDURE — 84443 ASSAY THYROID STIM HORMONE: CPT | Performed by: INTERNAL MEDICINE

## 2025-06-02 PROCEDURE — 84439 ASSAY OF FREE THYROXINE: CPT | Performed by: INTERNAL MEDICINE

## 2025-06-02 PROCEDURE — 99214 OFFICE O/P EST MOD 30 MIN: CPT | Performed by: INTERNAL MEDICINE

## 2025-06-02 PROCEDURE — 82947 ASSAY GLUCOSE BLOOD QUANT: CPT | Performed by: INTERNAL MEDICINE

## 2025-06-02 PROCEDURE — 83036 HEMOGLOBIN GLYCOSYLATED A1C: CPT | Performed by: INTERNAL MEDICINE

## 2025-06-02 PROCEDURE — 80061 LIPID PANEL: CPT | Performed by: INTERNAL MEDICINE

## 2025-06-02 PROCEDURE — 80053 COMPREHEN METABOLIC PANEL: CPT | Performed by: INTERNAL MEDICINE

## 2025-06-02 RX ORDER — DESONIDE 0.5 MG/G
OINTMENT TOPICAL
COMMUNITY
Start: 2025-05-11

## 2025-06-02 RX ORDER — EMPAGLIFLOZIN 25 MG/1
25 TABLET, FILM COATED ORAL DAILY
Qty: 90 TABLET | Refills: 3 | Status: SHIPPED | OUTPATIENT
Start: 2025-06-02

## 2025-06-02 RX ORDER — CHOLECALCIFEROL (VITAMIN D3) 1250 MCG
1 CAPSULE ORAL WEEKLY
COMMUNITY
Start: 2025-04-15

## 2025-06-02 RX ORDER — CALCIUM CARBONATE/VITAMIN D3 600MG-62.5
1 CAPSULE ORAL EVERY 12 HOURS SCHEDULED
COMMUNITY
Start: 2025-04-22

## 2025-06-02 RX ORDER — CYANOCOBALAMIN 1000 UG/ML
100 INJECTION, SOLUTION INTRAMUSCULAR; SUBCUTANEOUS
COMMUNITY
Start: 2025-04-15

## 2025-06-02 NOTE — PROGRESS NOTES
"Chief Complaint   Patient presents with    Diabetes     Type II Diabetes          HPI   Sammie Lim is a 49 y.o. female had concerns including Diabetes (Type II Diabetes).    She is checking blood sugars 0 times per day.  Current medications for diabetes include metformin 1000 mg twice daily, Jardiance 25 mg daily, Mounjaro 15 mg weekly.    Is drinking regular soda. Two per day. Drinking for the caffeine.     A1c down slightly to 7.3.  Weight is down an additional 6 pounds since her last visit here.    On levothyroxine 100 mcg daily.  Dose increased in January 2025.  Recheck TFTs    The following portions of the patient's history were reviewed and updated as appropriate: allergies, current medications, past family history, past medical history, past social history, past surgical history, and problem list.      Review of Systems     Physical Exam   /76 (BP Location: Right arm, Patient Position: Sitting)   Pulse 85   Ht 162.6 cm (64.02\")   Wt 60.1 kg (132 lb 6.4 oz)   SpO2 99%   BMI 22.72 kg/m²      Labs and imaging    A1C:  Lab Results   Component Value Date    HGBA1C 7.3 (A) 06/02/2025    HGBA1C 7.4 (A) 01/28/2025      Glucose:  Lab Results   Component Value Date    POCGLU 133 (A) 06/02/2025      CMP:  Lab Results   Component Value Date    GLUCOSE 140 (H) 09/24/2024    BUN 11 09/24/2024    CREATININE 0.52 (L) 09/24/2024     09/24/2024    K 4.2 09/24/2024     09/24/2024    CALCIUM 9.6 09/24/2024    PROTEINTOT 7.0 09/24/2024    ALBUMIN 4.4 09/24/2024    ALT 43 (H) 09/24/2024    AST 29 09/24/2024    ALKPHOS 50 09/24/2024    BILITOT 0.3 09/24/2024    GLOB 2.6 09/24/2024    AGRATIO 1.7 09/24/2024    BCR 21.2 09/24/2024    ANIONGAP 8.0 09/24/2024    EGFR 115.5 09/24/2024      Lipid Panel:  Lab Results   Component Value Date    CHOL 148 09/24/2024    TRIG 48 09/24/2024    HDL 74 (H) 09/24/2024    LDL 63 09/24/2024      Urine Microalbumin:  Lab Results   Component Value Date    MALBCRERATIO "  09/24/2024      Comment:      Unable to calculate      TSH:  Lab Results   Component Value Date    TSH 2.830 09/24/2024 1/17/2024 CMP with creatinine 0.63, , LFTs normal, A1c 7.1, TSH 3.06, vitamin D 39.3    Assessment and plan  Diagnoses and all orders for this visit:    1. Type 2 diabetes mellitus with hyperglycemia, with long-term current use of insulin (Primary)  Assessment & Plan:  Uncontrolled with some hyperglycemia.  A1c improved slightly to 7.3.  No complications from diabetes.  Start monitoring BG's more consistently.  Call the office if persistently elevated.  Continue metformin 1000 mg twice daily, Jardiance 25 mg daily, Mounjaro 15 mg weekly.  Eliminate regular soda. Change to diet.   Labs today.   Ophtho exam is up-to-date and will be requested.  Monofilament was updated 1/2025.    Orders:  -     POC Glucose, Blood  -     POC Glycosylated Hemoglobin (Hb A1C)  -     Comprehensive Metabolic Panel  -     Microalbumin / Creatinine Urine Ratio - Urine, Clean Catch  -     Jardiance 25 MG tablet tablet; Take 1 tablet by mouth Daily.  Dispense: 90 tablet; Refill: 3  -     metFORMIN (GLUCOPHAGE) 1000 MG tablet; Take 1 tablet by mouth 2 (Two) Times a Day With Meals.  Dispense: 180 tablet; Refill: 3  -     Tirzepatide 15 MG/0.5ML solution auto-injector; Inject 15 mg under the skin into the appropriate area as directed 1 (One) Time Per Week.  Dispense: 6 mL; Refill: 3    2. Acquired hypothyroidism  Assessment & Plan:  Levothyroxine was increased in January to 100 mcg daily.  Due for recheck TFTs.    Orders:  -     TSH  -     T4, Free    3. Mixed hyperlipidemia  Assessment & Plan:   On atorvastatin 20 mg daily.  LDL has been at goal.  Monitor yearly.    Orders:  -     Lipid Panel         Return in about 6 months (around 12/2/2025) for next scheduled follow up. The patient was instructed to contact the clinic with any interval questions or concerns.    Electronically signed by: Bouchra Chao DO    Endocrinologist    Please note that portions of this note were completed with a voice recognition program.

## 2025-06-02 NOTE — ASSESSMENT & PLAN NOTE
Uncontrolled with some hyperglycemia.  A1c improved slightly to 7.3.  No complications from diabetes.  Start monitoring BG's more consistently.  Call the office if persistently elevated.  Continue metformin 1000 mg twice daily, Jardiance 25 mg daily, Mounjaro 15 mg weekly.  Eliminate regular soda. Change to diet.   Labs today.   Ophtho exam is up-to-date and will be requested.  Monofilament was updated 1/2025.

## 2025-06-02 NOTE — TELEPHONE ENCOUNTER
----- Message from Bouchra Chao sent at 6/2/2025  8:36 AM EDT -----  Regarding: OPHTHO EXAM  Please request ophtho report from [facility] in [place]. TY!

## 2025-06-03 ENCOUNTER — RESULTS FOLLOW-UP (OUTPATIENT)
Dept: ENDOCRINOLOGY | Facility: CLINIC | Age: 50
End: 2025-06-03
Payer: COMMERCIAL

## 2025-06-03 DIAGNOSIS — E03.9 ACQUIRED HYPOTHYROIDISM: ICD-10-CM

## 2025-06-03 RX ORDER — LEVOTHYROXINE SODIUM 100 UG/1
100 TABLET ORAL DAILY
Qty: 90 TABLET | Refills: 3 | Status: SHIPPED | OUTPATIENT
Start: 2025-06-03

## 2025-07-21 DIAGNOSIS — E11.65 TYPE 2 DIABETES MELLITUS WITH HYPERGLYCEMIA, WITH LONG-TERM CURRENT USE OF INSULIN: ICD-10-CM

## 2025-07-21 DIAGNOSIS — Z79.4 TYPE 2 DIABETES MELLITUS WITH HYPERGLYCEMIA, WITH LONG-TERM CURRENT USE OF INSULIN: ICD-10-CM

## 2025-07-21 NOTE — TELEPHONE ENCOUNTER
Caller: Raphael Sammielicha Youngblood    Relationship: Self    Best call back number: [phone]     Requested Prescriptions:   Requested Prescriptions     Pending Prescriptions Disp Refills    metFORMIN (GLUCOPHAGE) 1000 MG tablet 180 tablet 3     Sig: Take 1 tablet by mouth 2 (Two) Times a Day With Meals.        Pharmacy where request should be sent:    [facility]/pharmacy #[id number] - [address] [phone] [facility] [phone]    Last office visit with prescribing clinician: 6/2/2025   Last telemedicine visit with prescribing clinician: Visit date not found   Next office visit with prescribing clinician: 12/15/2025     Additional details provided by patient: PT HAS 1 TABLET LEFT    Does the patient have less than a 3 day supply:  [x] Yes  [] No    Would you like a call back once the refill request has been completed: [x] Yes [] No    If the office needs to give you a call back, can they leave a voicemail: [] Yes [] No    Wilfred Leonardo Rep   07/21/25 12:26 EDT         DELETE AFTER READING TO PATIENT: “Thank you for sharing this information with me. I will send a message to the clinical team. Please allow 48 hours for the clinical staff to follow up on this request.”

## 2025-08-27 DIAGNOSIS — Z79.4 TYPE 2 DIABETES MELLITUS WITH HYPERGLYCEMIA, WITH LONG-TERM CURRENT USE OF INSULIN: ICD-10-CM

## 2025-08-27 DIAGNOSIS — E11.65 TYPE 2 DIABETES MELLITUS WITH HYPERGLYCEMIA, WITH LONG-TERM CURRENT USE OF INSULIN: ICD-10-CM
